# Patient Record
Sex: MALE | Race: WHITE | NOT HISPANIC OR LATINO | Employment: FULL TIME | URBAN - METROPOLITAN AREA
[De-identification: names, ages, dates, MRNs, and addresses within clinical notes are randomized per-mention and may not be internally consistent; named-entity substitution may affect disease eponyms.]

---

## 2019-12-12 ENCOUNTER — OFFICE VISIT - HEALTHEAST (OUTPATIENT)
Dept: FAMILY MEDICINE | Facility: CLINIC | Age: 34
End: 2019-12-12

## 2019-12-12 DIAGNOSIS — L03.116 CELLULITIS OF LEFT FOOT: ICD-10-CM

## 2019-12-12 DIAGNOSIS — L84 CALLUS OF FOOT: ICD-10-CM

## 2019-12-19 ENCOUNTER — SURGERY - HEALTHEAST (OUTPATIENT)
Dept: PODIATRY | Facility: CLINIC | Age: 34
End: 2019-12-19

## 2019-12-19 ENCOUNTER — OFFICE VISIT - HEALTHEAST (OUTPATIENT)
Dept: PODIATRY | Facility: CLINIC | Age: 34
End: 2019-12-19

## 2019-12-19 ENCOUNTER — AMBULATORY - HEALTHEAST (OUTPATIENT)
Dept: VASCULAR SURGERY | Facility: CLINIC | Age: 34
End: 2019-12-19

## 2019-12-19 ENCOUNTER — COMMUNICATION - HEALTHEAST (OUTPATIENT)
Dept: TELEHEALTH | Facility: CLINIC | Age: 34
End: 2019-12-19

## 2019-12-19 ENCOUNTER — HOSPITAL ENCOUNTER (OUTPATIENT)
Dept: RADIOLOGY | Facility: HOSPITAL | Age: 34
Discharge: HOME | End: 2019-12-19
Attending: PODIATRIST

## 2019-12-19 ENCOUNTER — HOSPITAL ENCOUNTER (OUTPATIENT)
Dept: MRI IMAGING | Facility: HOSPITAL | Age: 34
Discharge: HOME | End: 2019-12-19
Attending: PODIATRIST

## 2019-12-19 ENCOUNTER — OFFICE VISIT - HEALTHEAST (OUTPATIENT)
Dept: INTERNAL MEDICINE | Facility: CLINIC | Age: 34
End: 2019-12-19

## 2019-12-19 DIAGNOSIS — L03.116 CELLULITIS OF LEFT FOOT: ICD-10-CM

## 2019-12-19 DIAGNOSIS — L97.509 FOOT ULCER (H): ICD-10-CM

## 2019-12-19 DIAGNOSIS — L97.529 ULCER OF LEFT FOOT, UNSPECIFIED ULCER STAGE (H): ICD-10-CM

## 2019-12-19 DIAGNOSIS — Z01.818 PREOPERATIVE EXAMINATION: ICD-10-CM

## 2019-12-19 DIAGNOSIS — L97.523 SKIN ULCER OF LEFT FOOT WITH NECROSIS OF MUSCLE (H): ICD-10-CM

## 2019-12-19 DIAGNOSIS — L02.619 CELLULITIS AND ABSCESS OF FOOT EXCLUDING TOE: ICD-10-CM

## 2019-12-19 DIAGNOSIS — L03.119 CELLULITIS AND ABSCESS OF FOOT EXCLUDING TOE: ICD-10-CM

## 2019-12-19 ASSESSMENT — MIFFLIN-ST. JEOR: SCORE: 1654.74

## 2019-12-20 ENCOUNTER — SURGERY - HEALTHEAST (OUTPATIENT)
Dept: SURGERY | Facility: HOSPITAL | Age: 34
End: 2019-12-20

## 2019-12-20 ENCOUNTER — ANESTHESIA - HEALTHEAST (OUTPATIENT)
Dept: SURGERY | Facility: HOSPITAL | Age: 34
End: 2019-12-20

## 2019-12-20 ASSESSMENT — MIFFLIN-ST. JEOR: SCORE: 1654.74

## 2019-12-22 LAB
BACTERIA SPEC CULT: ABNORMAL
BACTERIA SPEC CULT: ABNORMAL
GRAM STAIN RESULT: ABNORMAL

## 2019-12-23 ENCOUNTER — COMMUNICATION - HEALTHEAST (OUTPATIENT)
Dept: VASCULAR SURGERY | Facility: CLINIC | Age: 34
End: 2019-12-23

## 2019-12-23 ENCOUNTER — AMBULATORY - HEALTHEAST (OUTPATIENT)
Dept: VASCULAR SURGERY | Facility: CLINIC | Age: 34
End: 2019-12-23

## 2019-12-23 DIAGNOSIS — L97.524 SKIN ULCER OF LEFT FOOT WITH NECROSIS OF BONE (H): ICD-10-CM

## 2019-12-26 ENCOUNTER — AMBULATORY - HEALTHEAST (OUTPATIENT)
Dept: VASCULAR SURGERY | Facility: CLINIC | Age: 34
End: 2019-12-26

## 2019-12-26 ENCOUNTER — COMMUNICATION - HEALTHEAST (OUTPATIENT)
Dept: VASCULAR SURGERY | Facility: CLINIC | Age: 34
End: 2019-12-26

## 2019-12-26 ENCOUNTER — AMBULATORY - HEALTHEAST (OUTPATIENT)
Dept: PODIATRY | Facility: CLINIC | Age: 34
End: 2019-12-26

## 2019-12-26 DIAGNOSIS — G89.18 POSTOPERATIVE PAIN: ICD-10-CM

## 2019-12-26 DIAGNOSIS — L97.524 SKIN ULCER OF LEFT FOOT WITH NECROSIS OF BONE (H): ICD-10-CM

## 2019-12-26 ASSESSMENT — MIFFLIN-ST. JEOR: SCORE: 1654.74

## 2019-12-30 ENCOUNTER — OFFICE VISIT - HEALTHEAST (OUTPATIENT)
Dept: PODIATRY | Facility: CLINIC | Age: 34
End: 2019-12-30

## 2019-12-30 ENCOUNTER — AMBULATORY - HEALTHEAST (OUTPATIENT)
Dept: VASCULAR SURGERY | Facility: CLINIC | Age: 34
End: 2019-12-30

## 2019-12-30 DIAGNOSIS — L97.524 SKIN ULCER OF LEFT FOOT WITH NECROSIS OF BONE (H): ICD-10-CM

## 2019-12-30 ASSESSMENT — MIFFLIN-ST. JEOR: SCORE: 1654.74

## 2020-01-03 ENCOUNTER — AMBULATORY - HEALTHEAST (OUTPATIENT)
Dept: VASCULAR SURGERY | Facility: CLINIC | Age: 35
End: 2020-01-03

## 2020-01-06 ENCOUNTER — AMBULATORY - HEALTHEAST (OUTPATIENT)
Dept: VASCULAR SURGERY | Facility: CLINIC | Age: 35
End: 2020-01-06

## 2020-01-06 DIAGNOSIS — L97.524 SKIN ULCER OF LEFT FOOT WITH NECROSIS OF BONE (H): ICD-10-CM

## 2020-01-08 ENCOUNTER — OFFICE VISIT - HEALTHEAST (OUTPATIENT)
Dept: VASCULAR SURGERY | Facility: CLINIC | Age: 35
End: 2020-01-08

## 2020-01-08 DIAGNOSIS — L97.524 SKIN ULCER OF LEFT FOOT WITH NECROSIS OF BONE (H): ICD-10-CM

## 2020-01-08 ASSESSMENT — MIFFLIN-ST. JEOR: SCORE: 1654.74

## 2020-01-10 ENCOUNTER — AMBULATORY - HEALTHEAST (OUTPATIENT)
Dept: VASCULAR SURGERY | Facility: CLINIC | Age: 35
End: 2020-01-10

## 2020-01-10 ENCOUNTER — RECORDS - HEALTHEAST (OUTPATIENT)
Dept: ADMINISTRATIVE | Facility: OTHER | Age: 35
End: 2020-01-10

## 2020-01-13 ENCOUNTER — AMBULATORY - HEALTHEAST (OUTPATIENT)
Dept: VASCULAR SURGERY | Facility: CLINIC | Age: 35
End: 2020-01-13

## 2020-01-13 DIAGNOSIS — L97.524 SKIN ULCER OF LEFT FOOT WITH NECROSIS OF BONE (H): ICD-10-CM

## 2020-01-15 ENCOUNTER — AMBULATORY - HEALTHEAST (OUTPATIENT)
Dept: VASCULAR SURGERY | Facility: CLINIC | Age: 35
End: 2020-01-15

## 2020-01-15 DIAGNOSIS — L97.524 SKIN ULCER OF LEFT FOOT WITH NECROSIS OF BONE (H): ICD-10-CM

## 2020-01-16 ENCOUNTER — OFFICE VISIT - HEALTHEAST (OUTPATIENT)
Dept: INFECTIOUS DISEASES | Facility: CLINIC | Age: 35
End: 2020-01-16

## 2020-01-16 DIAGNOSIS — T14.8XXA OPEN WOUND: ICD-10-CM

## 2020-01-17 ENCOUNTER — AMBULATORY - HEALTHEAST (OUTPATIENT)
Dept: VASCULAR SURGERY | Facility: CLINIC | Age: 35
End: 2020-01-17

## 2020-01-20 ENCOUNTER — AMBULATORY - HEALTHEAST (OUTPATIENT)
Dept: VASCULAR SURGERY | Facility: CLINIC | Age: 35
End: 2020-01-20

## 2020-01-20 DIAGNOSIS — L97.524 SKIN ULCER OF LEFT FOOT WITH NECROSIS OF BONE (H): ICD-10-CM

## 2020-01-20 ASSESSMENT — MIFFLIN-ST. JEOR: SCORE: 1709.17

## 2020-01-22 ENCOUNTER — OFFICE VISIT - HEALTHEAST (OUTPATIENT)
Dept: VASCULAR SURGERY | Facility: CLINIC | Age: 35
End: 2020-01-22

## 2020-01-22 DIAGNOSIS — L97.524 SKIN ULCER OF LEFT FOOT WITH NECROSIS OF BONE (H): ICD-10-CM

## 2020-02-05 ENCOUNTER — OFFICE VISIT - HEALTHEAST (OUTPATIENT)
Dept: VASCULAR SURGERY | Facility: CLINIC | Age: 35
End: 2020-02-05

## 2020-02-05 DIAGNOSIS — L97.524 SKIN ULCER OF LEFT FOOT WITH NECROSIS OF BONE (H): ICD-10-CM

## 2020-02-26 ENCOUNTER — OFFICE VISIT - HEALTHEAST (OUTPATIENT)
Dept: VASCULAR SURGERY | Facility: CLINIC | Age: 35
End: 2020-02-26

## 2020-02-26 DIAGNOSIS — L97.524 SKIN ULCER OF LEFT FOOT WITH NECROSIS OF BONE (H): ICD-10-CM

## 2020-02-26 ASSESSMENT — MIFFLIN-ST. JEOR: SCORE: 1709.17

## 2020-03-03 ENCOUNTER — COMMUNICATION - HEALTHEAST (OUTPATIENT)
Dept: OTHER | Facility: CLINIC | Age: 35
End: 2020-03-03

## 2020-03-10 ENCOUNTER — AMBULATORY - HEALTHEAST (OUTPATIENT)
Dept: OTHER | Facility: CLINIC | Age: 35
End: 2020-03-10

## 2020-03-20 ENCOUNTER — COMMUNICATION - HEALTHEAST (OUTPATIENT)
Dept: VASCULAR SURGERY | Facility: CLINIC | Age: 35
End: 2020-03-20

## 2020-03-25 ENCOUNTER — COMMUNICATION - HEALTHEAST (OUTPATIENT)
Dept: VASCULAR SURGERY | Facility: CLINIC | Age: 35
End: 2020-03-25

## 2020-03-26 ENCOUNTER — OFFICE VISIT - HEALTHEAST (OUTPATIENT)
Dept: VASCULAR SURGERY | Facility: CLINIC | Age: 35
End: 2020-03-26

## 2020-04-22 ENCOUNTER — COMMUNICATION - HEALTHEAST (OUTPATIENT)
Dept: VASCULAR SURGERY | Facility: CLINIC | Age: 35
End: 2020-04-22

## 2020-04-23 ENCOUNTER — AMBULATORY - HEALTHEAST (OUTPATIENT)
Dept: OTHER | Facility: CLINIC | Age: 35
End: 2020-04-23

## 2020-07-29 ENCOUNTER — OFFICE VISIT - HEALTHEAST (OUTPATIENT)
Dept: VASCULAR SURGERY | Facility: CLINIC | Age: 35
End: 2020-07-29

## 2020-07-29 DIAGNOSIS — L57.0 KERATOMA: ICD-10-CM

## 2020-07-29 DIAGNOSIS — M21.172 VARUS FOOT DEFORMITY, ACQUIRED, LEFT: ICD-10-CM

## 2021-05-26 VITALS
TEMPERATURE: 98 F | DIASTOLIC BLOOD PRESSURE: 78 MMHG | HEART RATE: 82 BPM | SYSTOLIC BLOOD PRESSURE: 120 MMHG | RESPIRATION RATE: 18 BRPM

## 2021-05-26 VITALS
HEART RATE: 80 BPM | DIASTOLIC BLOOD PRESSURE: 80 MMHG | TEMPERATURE: 97.7 F | SYSTOLIC BLOOD PRESSURE: 138 MMHG | RESPIRATION RATE: 18 BRPM

## 2021-05-26 VITALS — HEART RATE: 88 BPM | DIASTOLIC BLOOD PRESSURE: 68 MMHG | SYSTOLIC BLOOD PRESSURE: 118 MMHG | TEMPERATURE: 98.1 F

## 2021-05-27 ENCOUNTER — RECORDS - HEALTHEAST (OUTPATIENT)
Dept: ADMINISTRATIVE | Facility: CLINIC | Age: 36
End: 2021-05-27

## 2021-05-27 VITALS
TEMPERATURE: 97.8 F | SYSTOLIC BLOOD PRESSURE: 128 MMHG | DIASTOLIC BLOOD PRESSURE: 70 MMHG | RESPIRATION RATE: 20 BRPM | HEART RATE: 84 BPM

## 2021-05-27 VITALS
TEMPERATURE: 97.6 F | SYSTOLIC BLOOD PRESSURE: 118 MMHG | RESPIRATION RATE: 18 BRPM | HEART RATE: 84 BPM | DIASTOLIC BLOOD PRESSURE: 76 MMHG

## 2021-05-27 VITALS — HEART RATE: 88 BPM | SYSTOLIC BLOOD PRESSURE: 146 MMHG | DIASTOLIC BLOOD PRESSURE: 80 MMHG | TEMPERATURE: 98 F

## 2021-05-27 VITALS
TEMPERATURE: 98.4 F | DIASTOLIC BLOOD PRESSURE: 76 MMHG | RESPIRATION RATE: 16 BRPM | HEART RATE: 100 BPM | SYSTOLIC BLOOD PRESSURE: 116 MMHG

## 2021-05-27 VITALS
HEART RATE: 84 BPM | TEMPERATURE: 97.5 F | SYSTOLIC BLOOD PRESSURE: 120 MMHG | RESPIRATION RATE: 16 BRPM | DIASTOLIC BLOOD PRESSURE: 76 MMHG

## 2021-05-30 ENCOUNTER — RECORDS - HEALTHEAST (OUTPATIENT)
Dept: ADMINISTRATIVE | Facility: CLINIC | Age: 36
End: 2021-05-30

## 2021-06-04 VITALS
SYSTOLIC BLOOD PRESSURE: 118 MMHG | WEIGHT: 163 LBS | BODY MASS INDEX: 24.14 KG/M2 | DIASTOLIC BLOOD PRESSURE: 68 MMHG | HEIGHT: 69 IN | HEART RATE: 88 BPM | TEMPERATURE: 98.1 F

## 2021-06-04 VITALS
HEIGHT: 69 IN | HEART RATE: 92 BPM | SYSTOLIC BLOOD PRESSURE: 118 MMHG | TEMPERATURE: 98 F | RESPIRATION RATE: 20 BRPM | BODY MASS INDEX: 25.84 KG/M2 | DIASTOLIC BLOOD PRESSURE: 80 MMHG

## 2021-06-04 VITALS
SYSTOLIC BLOOD PRESSURE: 116 MMHG | DIASTOLIC BLOOD PRESSURE: 78 MMHG | TEMPERATURE: 98.2 F | WEIGHT: 175 LBS | BODY MASS INDEX: 25.84 KG/M2 | HEART RATE: 100 BPM

## 2021-06-04 VITALS
HEIGHT: 69 IN | DIASTOLIC BLOOD PRESSURE: 76 MMHG | BODY MASS INDEX: 24.14 KG/M2 | HEART RATE: 80 BPM | WEIGHT: 163 LBS | SYSTOLIC BLOOD PRESSURE: 136 MMHG

## 2021-06-04 VITALS
BODY MASS INDEX: 25.92 KG/M2 | RESPIRATION RATE: 16 BRPM | HEIGHT: 69 IN | HEART RATE: 84 BPM | WEIGHT: 175 LBS | SYSTOLIC BLOOD PRESSURE: 118 MMHG | DIASTOLIC BLOOD PRESSURE: 70 MMHG | TEMPERATURE: 98 F

## 2021-06-04 VITALS
WEIGHT: 175 LBS | RESPIRATION RATE: 16 BRPM | DIASTOLIC BLOOD PRESSURE: 70 MMHG | TEMPERATURE: 98.6 F | BODY MASS INDEX: 25.92 KG/M2 | HEART RATE: 100 BPM | SYSTOLIC BLOOD PRESSURE: 120 MMHG | HEIGHT: 69 IN

## 2021-06-04 VITALS
DIASTOLIC BLOOD PRESSURE: 89 MMHG | RESPIRATION RATE: 12 BRPM | HEART RATE: 94 BPM | SYSTOLIC BLOOD PRESSURE: 138 MMHG | WEIGHT: 166.9 LBS | TEMPERATURE: 97.8 F | OXYGEN SATURATION: 96 %

## 2021-06-04 VITALS
RESPIRATION RATE: 16 BRPM | DIASTOLIC BLOOD PRESSURE: 86 MMHG | BODY MASS INDEX: 24.14 KG/M2 | WEIGHT: 163 LBS | HEART RATE: 86 BPM | SYSTOLIC BLOOD PRESSURE: 122 MMHG | TEMPERATURE: 97.7 F | HEIGHT: 69 IN

## 2021-06-04 VITALS
HEIGHT: 69 IN | HEART RATE: 104 BPM | BODY MASS INDEX: 24.14 KG/M2 | RESPIRATION RATE: 16 BRPM | DIASTOLIC BLOOD PRESSURE: 78 MMHG | TEMPERATURE: 98 F | SYSTOLIC BLOOD PRESSURE: 150 MMHG | WEIGHT: 163 LBS

## 2021-06-04 VITALS — BODY MASS INDEX: 24.14 KG/M2 | HEIGHT: 69 IN | WEIGHT: 163 LBS

## 2021-06-04 VITALS
HEART RATE: 72 BPM | BODY MASS INDEX: 24.07 KG/M2 | TEMPERATURE: 97.9 F | RESPIRATION RATE: 17 BRPM | HEIGHT: 69 IN | SYSTOLIC BLOOD PRESSURE: 110 MMHG | DIASTOLIC BLOOD PRESSURE: 70 MMHG

## 2021-06-04 VITALS — TEMPERATURE: 98 F | SYSTOLIC BLOOD PRESSURE: 132 MMHG | DIASTOLIC BLOOD PRESSURE: 76 MMHG | WEIGHT: 166 LBS

## 2021-06-04 NOTE — ANESTHESIA PROCEDURE NOTES
Peripheral Block    Patient location during procedure: pre-op  Start time: 12/20/2019 3:50 PM  End time: 12/20/2019 3:55 PM  post-op analgesia per surgeon order as noted in medical record  Staffing:  Performing  Anesthesiologist: Miller Medina MD  Preanesthetic Checklist  Completed: patient identified, site marked, risks, benefits, and alternatives discussed, timeout performed, consent obtained, airway assessed, oxygen available, suction available, emergency drugs available and hand hygiene performed  Peripheral Block  Block type: sciatic, popliteal  Prep: ChloraPrep  Patient position: sitting  Patient monitoring: cardiac monitor, blood pressure, heart rate and continuous pulse oximetry  Laterality: left  Injection technique: ultrasound guided    Ultrasound used to visualize needle placement in proximity to nerve being blocked: yes   US used to visualize anesthetic spread  Visualized anatomic structures normal  No Pathological Findings  Permanent ultrasound image captured for medical record    Needle  Needle type: Stimuplex   Needle gauge: 20G  Needle length: 4 in  no peripheral nerve catheter placed  Assessment  Injection assessment: no difficulty with injection, negative aspiration for heme, no paresthesia on injection and incremental injection

## 2021-06-04 NOTE — PATIENT INSTRUCTIONS - HE
"    Important Instructions                     How to care for your wound    Cleanse wound with saline sent to you with your supplies or a wound wash found at the pharmacy.      Pat dry the wound with 4\"x4\" gauze      1. 3x weekly and as needed cleanse the wound NS    2. Pat dry    3. Apply Cavilon no sting barrier wipe to the skin surrounding the wound to protect from drainage/maceration    4. Cut strip of drape and apply to skin wear the bridging will occur if needed; plan this area in advance; should not be over bony prominence     5. Cut the foam to fit the size of the wound    6. Apply to wound    7. Cut narrow strip of foam for bridging if needed    8. Cover with drape to obtain air tight seal    9. Cut opening the size of a quarter for where the suction pad will be applied    10. Apply Suction pad    11. 125mmHG suction continuous       Do not get your ulcer wet when you shower.  You can cover it with a cast protector. Cast protectors are available for purchase at [facility]. You may also purchase a cast protector at your local pharmacy.      Good nutrition is important for wound healing.  I recommend increasing your protein.  You can do this through your diet, nutritional supplements, and/or protein powder.    It is ok to continue current wound care treatment/products for the next 2-3 days until new wound care supplies are ordered and arrive. If longer than this please contact our office.    Please call the [facility] before substituting any product    Call our clinic nurse at [phone] if there is an increase in drainage, pain, redness, or the wound size increases.  This maybe a sign of infection and require attention prior to the next appointment        "

## 2021-06-04 NOTE — PROGRESS NOTES
Surgery/Procedure: incision and drainage left foot abscess    Special Equipment: TBD    Location: [facility]    Date: 12/20/2019    Time: 5:15pm     Surgeon: Dr. Crandall    Assist: no    Length of Surgery: 60 mi n     OR Confirmed/ :  Yes with Esperanza on 12/19/2019    Orders In:  Yes    Provider Team Notified:  Yes    Entered on [place] [facility] [place] Calendar:  Yes    Post Op: 13/30/2019 @  DR crandall

## 2021-06-04 NOTE — ANESTHESIA CARE TRANSFER NOTE
Last vitals:   Vitals:    12/20/19 1635   BP: 120/63   Pulse: 79   Resp: 18   Temp: 36.7  C (98.1  F)   SpO2: 97%     Patient's level of consciousness is drowsy  Spontaneous respirations: yes  Maintains airway independently: yes  Dentition unchanged: yes  Oropharynx: oropharynx clear of all foreign objects    QCDR Measures:  ASA# 20 - Surgical Safety Checklist: WHO surgical safety checklist completed prior to induction    PQRS# 430 - Adult PONV Prevention: 4558F - Pt received => 2 anti-emetic agents (different classes) preop & intraop  ASA# 8 - Peds PONV Prevention: NA - Not pediatric patient, not GA or 2 or more risk factors NOT present  PQRS# 424 - Wandy-op Temp Management: 4559F - At least one body temp DOCUMENTED => 35.5C or 95.9F within required timeframe  PQRS# 426 - PACU Transfer Protocol: - Transfer of care checklist used  ASA# 14 - Acute Post-op Pain: ASA14B - Patient did NOT experience pain >= 7 out of 10

## 2021-06-04 NOTE — ANESTHESIA POSTPROCEDURE EVALUATION
Patient: Levi Soliz  INCISION AND DRAINAGE, Left foot  Anesthesia type: general    Patient location: PACU  Last vitals:   Vitals Value Taken Time   /88 12/20/2019  4:50 PM   Temp 36.7  C (98.1  F) 12/20/2019  4:35 PM   Pulse 76 12/20/2019  5:00 PM   Resp 18 12/20/2019  4:35 PM   SpO2 97 % 12/20/2019  5:00 PM   Vitals shown include unvalidated device data.  Post vital signs: stable  Level of consciousness: awake and responds to simple questions  Post-anesthesia pain: pain controlled  Post-anesthesia nausea and vomiting: no  Pulmonary: unassisted, return to baseline  Cardiovascular: stable and blood pressure at baseline  Hydration: adequate  Anesthetic events: no    QCDR Measures:  ASA# 11 - Wandy-op Cardiac Arrest: ASA11B - Patient did NOT experience unanticipated cardiac arrest  ASA# 12 - Wandy-op Mortality Rate: ASA12B - Patient did NOT die  ASA# 13 - PACU Re-Intubation Rate: NA - No ETT / LMA used for case  ASA# 10 - Composite Anes Safety: ASA10A - No serious adverse event    Additional Notes:

## 2021-06-04 NOTE — ANESTHESIA PREPROCEDURE EVALUATION
Anesthesia Evaluation      Patient summary reviewed   No history of anesthetic complications     Airway   Mallampati: I  Neck ROM: full   Pulmonary - negative ROS and normal exam   (+) a smoker                         Cardiovascular - negative ROS and normal exam  Exercise tolerance: > or = 4 METS  (-) murmur  Rhythm: regular  Rate: normal,    no murmur      Neuro/Psych - negative ROS     Endo/Other - negative ROS      GI/Hepatic/Renal - negative ROS   (+) GERD,        Other findings: Spina bifida      Dental - normal exam                        Anesthesia Plan  Planned anesthetic: general mask and peripheral nerve block    ASA 2   Induction: intravenous   Anesthetic plan and risks discussed with: patient and parent/guardian  Anesthesia plan special considerations: antiemetics,   Post-op plan: routine recovery

## 2021-06-04 NOTE — PATIENT INSTRUCTIONS - HE
"      Important Instructions     * make an appointment for your MRI     * make an appointment for a pre-op physical                    How to care for your wound    Cleanse wound with saline sent to you with your supplies or a wound wash found at the pharmacy.      Pat dry the wound with 4\"x4\" gauze      Cover the wound with [id number] dry gauze.      Wrap the affected area with 4 inch roll gauze.      Secure dressings in place with paper tape.      Change dressing: every day      Do not get your ulcer wet when you shower.  You can cover it with a cast protector. Cast protectors are available for purchase at [facility]. You may also purchase a cast protector at your local pharmacy.      Good nutrition is important for wound healing.  I recommend increasing your protein.  You can do this through your diet, nutritional supplements, and/or protein powder.    It is ok to continue current wound care treatment/products for the next 2-3 days until new wound care supplies are ordered and arrive. If longer than this please contact our office.    Please call the [facility] before substituting any product    Call our clinic nurse at [phone] if there is an increase in drainage, pain, redness, or the wound size increases.  This maybe a sign of infection and require attention prior to the next appointment    Surgery Information    Surgery Date 12/20/19    Surgery Time TBD  ( Arrive at the hospital two hours prior to your surgery and 1 hour prior at the surgery center. Check in at the . The only exception is if you are scheduled for surgery at 7am, you should arrive at 5:30am)    IF YOU NEED TO RESCHEDULE OR CANCEL YOUR SURGERY FOR ANY REASON PLEASE CALL THE CLINIC AS SOON AS POSSIBLE [phone].    Admission Type: Outpatient    Surgeon: Dr. Crandall    Surgery Procedure: Incision & drainage left foot    Surgery Location: [facility],[address] [place] [address] " Admitting      Additional Information: If you use a CPAP machine for sleep apnea please bring it with you for surgery. We will want to monitor your breathing using your normal equipment.     HOSPITAL AND SURGERY CENTER INFORMATION    We need to know a lot of information about you before surgery.     1-5 Days Before:     A nurse will call you for a pre-screening interview. The phone call with the nurse will be much faster and easier if you  Have organized your information prior to the call.     Please have the following information available:    Preoperative Exam completed and faxed to our office    Primary care provider's and any specialty providers' contact information available. .    If requested by your primary care provider, have any heart and lung exams at least 3 days before surgery.    Have a complete and accurate list of medications available.     Have a list of your allergies/sensitivities and reactions    Know your health history, surgical history, and medical problems    Know any anesthesia issues with you or within your family.     BE SURE TO NOTIFY US IF YOU ARE TAKING ANY BLOOD THINNING AGENTS: Coumadin, Plavix, Aspirin, Xarelto, Eliquis    Someone planned to bring you and stay with you after the surgery    Day Before Surgery    1. STOP Smoking and Drinking: It is important to stop smoking and drinking at least 24 hours before surgery. Smoking and drinking may cause complications in your recovery from anesthesia and may lengthen the healing process.    2. Pack for your hospital stay: If it will be required for you to stay at the hospital after surgery, bring personal items such as a robe, slippers, pajamas, additional clothing and toiletries. Don't forget:    List of medication    Eyeglass case or contact case with solution. You cannot wear contacts during surgery    Copies of your physical exam , lab results and EKG    Copy of Health Care Directives, Living Will or Power of     Insurance  Cards    Photo ID    CPAP machine    3. NOTHING BY MOUTH 8 HOURS BEFORE. This includes gum, hard candy, water and mints. The only exception is if you have been instructed by your doctor to take your medications with sips of water. You may rinse your mouth or brush your teeth, but do not swallow water.     4. Remove Nail Polish  Day of Surgery    1. Medications- Take as indicated with sips of water     2. Wear comfortable loose fitting clothes. Wear your glasses-Not contacts. Do not wear jewelry and remove body piercing's. Surgery may be cancelled if they are not removed.     3. If same day surgery-Have a someone come with you to surgery that can help you understand the surgeon's instructions, drive you home and stay with you overnight the first night.     4. A nurse will call you at home within 72 hours following surgery to see how you are doing. Our nurses and doctors will discuss recovery with you.

## 2021-06-04 NOTE — PROGRESS NOTES
FOOT AND ANKLE SURGERY/PODIATRY Progress Note      ASSESSMENT:   Ulceration left foot      TREATMENT:  -The left foot ulceration is progressing well. No signs of infection. I recommend he continue to remain non-weight bearing with the knee walker, continue with the wound vac. He will finish course of Bactrim. Reviewed C&S.     -After discussion of risk factors and consent obtained 2% Lidocaine HCL jelly was applied, under clean conditions, the left and foot ulceration(s) were debrided using currette.  Devitalized and nonviable tissue, along with any fibrin and slough, was removed to improve granulation tissue formation, stimulate wound healing, decrease overall bacteria load, disrupt biofilm formation and decrease edge senescence.  Total excisional debridement was 3.4 sq cm into the muscle/fascia with a depth of 0.8 cm.   Ulcers were improved afterwards and .  Measures were as noted on the flow sheet. Patient tolerated this well. Wound vac applied today.    -He will follow-up with me in one week.       Jai Crandall DPM  [facility]      HPI: Levi Soliz was seen again today for a left foot ulceration s/p I&D. Doing well. He has used the wound vac x4 days. Remained non-weight bearing on the knee walker.       Past Medical History:   Diagnosis Date     GERD (gastroesophageal reflux disease)      Spina bifida (H)        Past Surgical History:   Procedure Laterality Date     INCISION AND DRAINAGE OF WOUND Left 12/20/2019    Procedure: INCISION AND DRAINAGE, Left foot;  Surgeon: Jai Crandall DPM;  [place] [facility] [place]  Service: Podiatry       No Known Allergies      Current Outpatient Medications:      HYDROcodone-acetaminophen 5-325 mg per tablet, Take 1 tablet by mouth every 4 (four) hours as needed., Disp: 30 tablet, Rfl: 0     HYDROcodone-acetaminophen 5-325 mg per tablet, Take 1 tablet by mouth every 4 (four) hours as needed for pain., Disp: 18 tablet, Rfl: 0      ranitidine (ZANTAC) 150 MG capsule, Take 150 mg by mouth every evening., Disp: , Rfl:     Review of Systems - 10 point Review of Systems is negative except for left foot which is noted in HPI.      OBJECTIVE:  Vitals:    12/30/19 0909   BP: 118/68   Pulse: 88   Temp: 98.1  F (36.7  C)     General appearance: Patient is alert and fully cooperative with history & exam.  No sign of distress is noted during the visit.   Vascular: Dorsalis pedis palpableLeft.  Dermatologic:    VASC Wound 12/19/19 Left plantar 5th MPJ (Active)   Pre Size Length 1.7 12/30/2019  9:05 AM   Pre Size Width 2 12/30/2019  9:05 AM   Pre Size Depth 1.5 12/30/2019  9:05 AM   Pre Total Sq cm 3.4 12/30/2019  9:05 AM   Undermined no  12/30/2019 10:00 AM       VASC Wound 12/26/19 left foot (Active)       Incision 12/20/19 Surgical Foot Left (Active)   Granular tissue, exposed 5th MPJ capsule along the plantar joint. No erythema.   Neurologic: Diminished to light touch Left.  Musculoskeletal: Contracted digits noted Left.    Imaging:     Xr Orbit Foreign Body Pre Mr    Result Date: 12/20/2019  EXAM: XR ORBIT FOREIGN BODY PRE MR LOCATION: [facility] DATE/TIME: 12/19/2019 3:11 PM INDICATION: pre mri  COMPARISON: None.     Negative orbits. Both eyes are negative for metallic foreign bodies. Metallic density permanent retainer is superimposed over the anterior mandibular teeth.    Xr Foot Left 3 Or More Vws Standing    Result Date: 12/20/2019  Negative for bony destruction or cortical reaction.     Mr Forefoot With Without Contrast Left    Result Date: 12/19/2019  EXAM: MR FOREFOOT W WO CONTRAST LEFT LOCATION: [facility] DATE/TIME: 12/19/2019 4:20 PM INDICATION: Osteomyelitis fifth metatarsal left. COMPARISON: None. TECHNIQUE: Routine. Additional postgadolinium T1 sequences were obtained. IV CONTRAST: Gadavist 8ml FINDINGS:  JOINTS AND BONES: There is evidence of old healed fracture of the fifth metatarsal. This fracture  appears well-healed. There is T2 signal abnormality and abnormal enhancement within the fifth metatarsal head but no loss of T1 marrow signal. However, there is slight cortical irregularity along the undersurface margin and findings are worrisome for early developing osteomyelitis but there is no bone destruction present. There is no evidence for abscess. There is a tiny amount of fluid within the fifth  MTP joint and septic arthropathy could have this appearance. The proximal phalanx of the fifth toe appears normal, however. Hammertoe deformities. No evidence for acute fracture. Mild bunion deformity. TENDONS: The Achilles tendon is not included on the field-of-view and cannot be evaluated. Within the foot, the flexor tendons are negative for tendinopathy, tenosynovitis, or tearing. The extensor tendons are negative for tendinopathy, tenosynovitis, or  tearing. The hallucis tendons are negative. LIGAMENTS: The ligament of Lisfranc is intact. The collateral ligament at the MTP joints are all intact. MUSCLES AND SOFT TISSUES: There is significant muscular atrophy of all of the interosseous muscles. The previously described focus of edema/inflammation along the inferolateral margin of the forefoot adjacent to the fifth MTP joint. Additional edema or cellulitis along the dorsal aspect of the foot. No evidence for organized fluid collection or abscess.     1.  There is evidence of an old healed fracture of the fifth metatarsal. 2.  There is T2 signal abnormality within the fifth metatarsal head as well as abnormal enhancement. While there is no confluent loss of T1 marrow signal, there is slight cortical irregularity along the undersurface and findings are worrisome for early developing changes of osteomyelitis. 3. There is a tiny amount of fluid within the fifth MTP joint. A septic arthropathy could have this appearance. There is no       evidence for marrow signal abnormality within the base of the proximal phalanx of  "the fifth toe. 4. Surrounding edema or cellulitis but no evidence for abscess. 5. Additional edema or cellulitis along the dorsal aspect of the foot. 6. No evidence for tendinous or ligamentous tearing. 7. Fatty infiltration and atrophy of all of the interosseous muscles. 8. Exam otherwise negative.     Poc Us Guidance Needle Placement    Result Date: 12/20/2019  Ultrasound was performed as guidance to an anesthesia procedure.  Click \"PACS images\" hyperlink below to view any stored images.  For specific procedure details, view procedure note authored by anesthesia.         Picture: None    "

## 2021-06-04 NOTE — TELEPHONE ENCOUNTER
Patient is calling and would like another refill of the Hydrocodone-acetaminophen, to help with the discomfort of the wound vac

## 2021-06-04 NOTE — PROGRESS NOTES
FOOT AND ANKLE SURGERY/PODIATRY CONSULT NOTE         ASSESSMENT:   Cellulitis left foot  Ulceration left foot       TREATMENT:  -Large ulceration along the plantar 5th MPJ which probes to deep tissues, possible bone, non-viable tissue with localized erythema. Wound culture obtained today. Because the wound probes to deep tissues, I would like to evaluate for osteomyelitis and have referred him for an MRI. I reviewed today's x-rays which are negative for bony destruction or cortical reaction.     -Sharp, excisional debridement with a #15 blade into level of muscle debriding 5 sq cm. Patient tolerated the procedure well. Gauze dressing applied which he will change daily.     -I recommend at minimum surgical debridement to remove non-viable tissue. If MRI report indicates osteomyelitis, we discussed amputation of the 5th ray but will likely avoid amputation as x-rays are negative for bony destruction. Bone biopsy would be necessary.     -All questions invited and answered. I have extended Bactrim for an additional 10 days, d/c keflex. STAT MRI for today. I have asked my office to coordinate surgery at [facility] for tomorrow afternoon. Pre-op physical with [facility] family practice today.     Jai Crandall, WOO  [facility]/Foot & Ankle Surgery      HPI: I was asked to see Levi Soliz today by Kari Dorman for a left foot infection. The patient reports noticing redness along the 5th MPJ 2-3 weeks ago. He was seen in walk-in care and placed on bactrim. PMH is significant for spinda bifida and has tried foot inserts in the past to offload the plantar joint. He also underwent an elevating osteotomy of the 5th metatarsal in the past. Patient denies N/V/F/C.     No past medical history on file.    No past surgical history on file.    No Known Allergies      Current Outpatient Medications:      cephalexin (KEFLEX) 500 MG capsule, TK 1 C PO TID TAT, Disp: , Rfl:      sulfamethoxazole-trimethoprim  (BACTRIM DS) 800-160 mg per tablet, Take 1 tablet by mouth 2 (two) times a day for 10 days., Disp: 20 tablet, Rfl: 0    No family history on file.    Social History     Socioeconomic History     Marital status: Single     Spouse name: Not on file     Number of children: Not on file     Years of education: Not on file     Highest education level: Not on file   Occupational History     Not on file   Social Needs     Financial resource strain: Not on file     Food insecurity:     Worry: Not on file     Inability: Not on file     Transportation needs:     Medical: Not on file     Non-medical: Not on file   Tobacco Use     Smoking status: Current Every Day Smoker     Smokeless tobacco: Never Used   Substance and Sexual Activity     Alcohol use: Not on file     Drug use: Not on file     Sexual activity: Not on file   Lifestyle     Physical activity:     Days per week: Not on file     Minutes per session: Not on file     Stress: Not on file   Relationships     Social connections:     Talks on phone: Not on file     Gets together: Not on file     Attends Taoism service: Not on file     Active member of club or organization: Not on file     Attends meetings of clubs or organizations: Not on file     Relationship status: Not on file     Intimate partner violence:     Fear of current or ex partner: Not on file     Emotionally abused: Not on file     Physically abused: Not on file     Forced sexual activity: Not on file   Other Topics Concern     Not on file   Social History Narrative     Not on file       Review of Systems - 10 point Review of Systems is negative except for left foot infection which is noted in HPI.    OBJECTIVE:  Appearance: alert, well appearing, and in no distress.    Vitals:    12/19/19 1246   BP: 132/76   Temp: 98  F (36.7  C)       BMI= There is no height or weight on file to calculate BMI.    General appearance: Patient is alert and fully cooperative with history & exam.  No sign of distress is noted  during the visit.     Psychiatric: Affect is pleasant & appropriate.  Patient appears motivated to improve health.     Respiratory: Breathing is regular & unlabored while sitting.     HEENT: Hearing is intact to spoken word.  Speech is clear.  No gross evidence of visual impairment that would impact ambulation.      Vascular: Dorsalis pedis and posterior tibial pulses are palpable. There is pedal hair growth left.  CFT < 3 sec from anterior tibial surface to distal digits left. Mild edema 5th MPJ left.   Dermatologic: After sharp debridement of hyperkeratotic tissue plantar 5th MPJ, there is a full thickness ulceration measuring 2.5x2.5x1.1cm, probes to deep tissues and possible to bone with significant non-viable tissue. No fluctuance. Localized erythema to the 5th MPJ without ascending cellulitis. No active drainage or purulence noted.   Neurologic: Diminished to light touch left foot.   Musculoskeletal: Increased medial arch left with prominent metatarsal heads, contracted digits.

## 2021-06-04 NOTE — PATIENT INSTRUCTIONS - HE
If you do not have a back up plan in place: If the negative pressure wound therapy malfunctions or unable to maintain seal: dressing must be removed and reapplied within 2 hours of the incident. If unable to reapply negative pressure wound dressing, place Normal Saline moistened gauze in wound bed and cover with appropriate dressing to keep wound bed moist.  Change wet-to-dry dressing two times a day until healthcare staff can re-implement negative pressure therapy.   Change canister at least weekly.  [facility], later [facility] Contact Center can be reached at [phone], 24 hours a day 7 days a week    What is V.A.C.  Therapy?   V.A.C.  Therapy is a medical device system that   promotes wound healing by delivering negative   pressure (a vacuum) to the wound through a   patented dressing and therapy unit creating an   environment that promotes the wound healing   process. This negative pressure helps draw wound   edges together, remove wound fluids and infectious   materials and promote granulation tissue formation   (the connective tissue in healing wounds).   Unlike gauze bandages that merely cover a wound,   V.A.C.  Therapy actively works to help the wound   healing process.   The V.A.C.  Therapy System helps:     Promote wound healing     Provide a moist wound healing environment     Draw wound edges together     Remove fluid and infectious materials     Reduce wound odor     Reduce the need for daily dressing changes   When should I call my clinician when on   V.A.C.  Therapy?   Immediately report to your clinician if you have any   of these symptoms:     Fever over 102      Diarrhea     Headache     Sore throat     Confusion     Sick to your stomach or throwing up     Dizziness or feel faint when you stand up     Redness around the wound     Skin itches or rash present     Wound is sore, red or swollen     Pus or bad smell from the wound     Area in or around wound feels very warm    Vacuum-Assisted Closure of a  Wound  Vacuum-assisted closure (VAC) of a wound is a type of treatment to help wounds heal. It s also known as negative pressure wound therapy. During the treatment, a device lowers air pressure on the wound. This can help the wound heal more quickly.  Understanding the wound VAC system  A wound VAC system has several parts. A foam or gauze dressing is put directly on the wound. The dressing is changed every 24 to 72 hours. An adhesive film covers and seals the dressing and wound. A drainage tube leads from under the adhesive film and connects to a portable vacuum pump. This pump removes air pressure over the wound. It may do this constantly. Or it may do it in cycles. During the treatment, you ll need to carry the portable pump everywhere you go.  Why wound VAC is used  You might need this therapy for a recent traumatic wound. Or you may need it for a chronic wound. This is a wound that does not heal the way it should over time. This can happen with wounds in people who have diabetes. You may need a wound VAC if you ve had a recent skin graft. And you may need a wound VAC for a large wound. Large wounds can take a longer time to heal.  A wound vacuum system may help your wound heal more quickly by:    Draining extra fluid from the wound    Reducing swelling    Reducing bacteria in the wound    Keeping your wound moist and warm    Helping draw together wound edges    Increasing blood flow to your wound    Decreasing inflammation  Wound VAC offers some other advantages over other types of wound care. It may decrease your overall discomfort. The dressings usually need to be changed less often. And they may be easier to keep in position.  Risks of wound VAC  Wound VAC has some rare risks, such as:    Bleeding (which may be severe)    Wound infection    An abnormal connection between the intestinal tract and the skin (enteric fistula)  Proper training in dressing changes can help reduce the risk for these complications.  Also, your doctor will carefully evaluate you to make sure you are a good candidate for the therapy. Certain problems can increase your risk for complications. These include:    Exposed organs or blood vessels    High risk of bleeding from another medical problem    Wound infection    Nearby bone infection    Dead wound tissue    Cancer tissue    Fragile skin, such as from aging or longtime use of topical steroids    Allergy to adhesive    Very poor blood flow to your wound    Wounds close to joints that may reopen because of movement  Your doctor will discuss the risks that apply to you. Make sure to talk with him or her about all of your questions and concerns.  Getting ready for wound VAC  You likely won t need to do much to get ready for wound VAC. In some cases, you may need to wait a while before having this therapy. For example, your doctor may first need to treat an infection in your wound. Dead or damaged tissue may also need to be removed from your wound.  You or a caregiver may need training on how to use the wound VAC device. This is done if you will be able to have your wound vacuum therapy at home. In other cases, you may need to have your wound vacuum therapy in a health care facility.  On the day of your procedure  A health care provider will cover your wound with foam or gauze wound dressing. An adhesive film will be put over the dressing and wound. This seals the wound. The foam connects to a drainage tube, which leads to a vacuum pump. This pump is portable. When the pump is turned on, it draws fluid through the foam and out the drainage tubing. The pump may run constantly, or it may cycle off and on. Your exact setup will depend on the specific type of wound vacuum system that you use.  Managing your wound  You may need the dressing changed about once a day. You may need it changed more or less often, depending on your wound. You or your caregiver may be trained to do this at home. Or it may be  done by a visiting health care provider. Your doctor may prescribe a pain medicine. This is to prevent or reduce pain during the dressing change.  You will likely need to use the wound VAC system for several weeks or months. During this time, you ll carry the portable pump everywhere you go.  Nutrition for wound healing  During this time, make sure you follow a healthy diet. This is needed so the wound can heal and to prevent infection. Your doctor can tell you more about what to include in your diet during this time.  follow up with your doctor if you have a medical condition that led to your wound, such as diabetes. He or she can help you prevent future wounds.  Follow-up care  Your doctor will carefully keep track of your healing. Make sure to keep all follow-up appointments.  When to call your health care provider  Call your health care provider right away if you have any of these:    Fever of 100.4 F (38.0 C) or higher    Increased redness, swelling, or warmth around wound    Increased pain    Bright red blood or blood clots in tubing or the collection chamber of the vacuum

## 2021-06-04 NOTE — PATIENT INSTRUCTIONS - HE
If you do not have a back up plan in place: If the negative pressure wound therapy malfunctions or unable to maintain seal: dressing must be removed and reapplied within 2 hours of the incident. If unable to reapply negative pressure wound dressing, place Normal Saline moistened gauze in wound bed and cover with appropriate dressing to keep wound bed moist.  Change wet-to-dry dressing two times a day until healthcare staff can re-implement negative pressure therapy.   Change canister at least weekly.  [facility] Contact Center can be reached at [phone], 24 hours a day 7 days a week    What is V.A.C.  Therapy?   V.A.C.  Therapy is a medical device system that   promotes wound healing by delivering negative   pressure (a vacuum) to the wound through a   patented dressing and therapy unit creating an   environment that promotes the wound healing   process. This negative pressure helps draw wound   edges together, remove wound fluids and infectious   materials and promote granulation tissue formation   (the connective tissue in healing wounds).   Unlike gauze bandages that merely cover a wound,   V.A.C.  Therapy actively works to help the wound   healing process.   The V.A.C.  Therapy System helps:     Promote wound healing     Provide a moist wound healing environment     Draw wound edges together     Remove fluid and infectious materials     Reduce wound odor     Reduce the need for daily dressing changes   When should I call my clinician when on   V.A.C.  Therapy?   Immediately report to your clinician if you have any   of these symptoms:     Fever over 102      Diarrhea     Headache     Sore throat     Confusion     Sick to your stomach or throwing up     Dizziness or feel faint when you stand up     Redness around the wound     Skin itches or rash present     Wound is sore, red or swollen     Pus or bad smell from the wound     Area in or around wound feels very warm    Vacuum-Assisted Closure of a  Wound  Vacuum-assisted closure (VAC) of a wound is a type of treatment to help wounds heal. It s also known as negative pressure wound therapy. During the treatment, a device lowers air pressure on the wound. This can help the wound heal more quickly.  Understanding the wound VAC system  A wound VAC system has several parts. A foam or gauze dressing is put directly on the wound. The dressing is changed every 24 to 72 hours. An adhesive film covers and seals the dressing and wound. A drainage tube leads from under the adhesive film and connects to a portable vacuum pump. This pump removes air pressure over the wound. It may do this constantly. Or it may do it in cycles. During the treatment, you ll need to carry the portable pump everywhere you go.  Why wound VAC is used  You might need this therapy for a recent traumatic wound. Or you may need it for a chronic wound. This is a wound that does not heal the way it should over time. This can happen with wounds in people who have diabetes. You may need a wound VAC if you ve had a recent skin graft. And you may need a wound VAC for a large wound. Large wounds can take a longer time to heal.  A wound vacuum system may help your wound heal more quickly by:    Draining extra fluid from the wound    Reducing swelling    Reducing bacteria in the wound    Keeping your wound moist and warm    Helping draw together wound edges    Increasing blood flow to your wound    Decreasing inflammation  Wound VAC offers some other advantages over other types of wound care. It may decrease your overall discomfort. The dressings usually need to be changed less often. And they may be easier to keep in position.  Risks of wound VAC  Wound VAC has some rare risks, such as:    Bleeding (which may be severe)    Wound infection    An abnormal connection between the intestinal tract and the skin (enteric fistula)  Proper training in dressing changes can help reduce the risk for these complications.  Also, your doctor will carefully evaluate you to make sure you are a good candidate for the therapy. Certain problems can increase your risk for complications. These include:    Exposed organs or blood vessels    High risk of bleeding from another medical problem    Wound infection    Nearby bone infection    Dead wound tissue    Cancer tissue    Fragile skin, such as from aging or longtime use of topical steroids    Allergy to adhesive    Very poor blood flow to your wound    Wounds close to joints that may reopen because of movement  Your doctor will discuss the risks that apply to you. Make sure to talk with him or her about all of your questions and concerns.  Getting ready for wound VAC  You likely won t need to do much to get ready for wound VAC. In some cases, you may need to wait a while before having this therapy. For example, your doctor may first need to treat an infection in your wound. Dead or damaged tissue may also need to be removed from your wound.  You or a caregiver may need training on how to use the wound VAC device. This is done if you will be able to have your wound vacuum therapy at home. In other cases, you may need to have your wound vacuum therapy in a health care facility.  On the day of your procedure  A health care provider will cover your wound with foam or gauze wound dressing. An adhesive film will be put over the dressing and wound. This seals the wound. The foam connects to a drainage tube, which leads to a vacuum pump. This pump is portable. When the pump is turned on, it draws fluid through the foam and out the drainage tubing. The pump may run constantly, or it may cycle off and on. Your exact setup will depend on the specific type of wound vacuum system that you use.  Managing your wound  You may need the dressing changed about once a day. You may need it changed more or less often, depending on your wound. You or your caregiver may be trained to do this at home. Or it may be  done by a visiting health care provider. Your doctor may prescribe a pain medicine. This is to prevent or reduce pain during the dressing change.  You will likely need to use the wound VAC system for several weeks or months. During this time, you ll carry the portable pump everywhere you go.  Nutrition for wound healing  During this time, make sure you follow a healthy diet. This is needed so the wound can heal and to prevent infection. Your doctor can tell you more about what to include in your diet during this time.  follow up with your doctor if you have a medical condition that led to your wound, such as diabetes. He or she can help you prevent future wounds.  Follow-up care  Your doctor will carefully keep track of your healing. Make sure to keep all follow-up appointments.  When to call your health care provider  Call your health care provider right away if you have any of these:    Fever of 100.4 F (38.0 C) or higher    Increased redness, swelling, or warmth around wound    Increased pain    Bright red blood or blood clots in tubing or the collection chamber of the vacuum

## 2021-06-04 NOTE — TELEPHONE ENCOUNTER
Pt called stating that he received a call over the weekend that he was to get a wound vac today and it was to be applied. Pt stated that the wound vac won't be delivered until tomorrow. No order in chart for wound vac. Is the pt to change his dressing before the wound vac arrives? Need wound vac orders. Is patient to come to the clinic for changes? Please advise.

## 2021-06-05 NOTE — PATIENT INSTRUCTIONS - HE
If you do not have a back up plan in place: If the negative pressure wound therapy malfunctions or unable to maintain seal: dressing must be removed and reapplied within 2 hours of the incident. If unable to reapply negative pressure wound dressing, place Normal Saline moistened gauze in wound bed and cover with appropriate dressing to keep wound bed moist.  Change wet-to-dry dressing two times a day until healthcare staff can re-implement negative pressure therapy.   Change canister at least weekly.  [facility] Contact Center can be reached at [phone], 24 hours a day 7 days a week    What is V.A.C.  Therapy?   V.A.C.  Therapy is a medical device system that   promotes wound healing by delivering negative   pressure (a vacuum) to the wound through a   patented dressing and therapy unit creating an   environment that promotes the wound healing   process. This negative pressure helps draw wound   edges together, remove wound fluids and infectious   materials and promote granulation tissue formation   (the connective tissue in healing wounds).   Unlike gauze bandages that merely cover a wound,   V.A.C.  Therapy actively works to help the wound   healing process.   The V.A.C.  Therapy System helps:     Promote wound healing     Provide a moist wound healing environment     Draw wound edges together     Remove fluid and infectious materials     Reduce wound odor     Reduce the need for daily dressing changes   When should I call my clinician when on   V.A.C.  Therapy?   Immediately report to your clinician if you have any   of these symptoms:     Fever over 102      Diarrhea     Headache     Sore throat     Confusion     Sick to your stomach or throwing up     Dizziness or feel faint when you stand up     Redness around the wound     Skin itches or rash present     Wound is sore, red or swollen     Pus or bad smell from the wound     Area in or around wound feels very warm    Vacuum-Assisted Closure of a  Wound  Vacuum-assisted closure (VAC) of a wound is a type of treatment to help wounds heal. It s also known as negative pressure wound therapy. During the treatment, a device lowers air pressure on the wound. This can help the wound heal more quickly.  Understanding the wound VAC system  A wound VAC system has several parts. A foam or gauze dressing is put directly on the wound. The dressing is changed every 24 to 72 hours. An adhesive film covers and seals the dressing and wound. A drainage tube leads from under the adhesive film and connects to a portable vacuum pump. This pump removes air pressure over the wound. It may do this constantly. Or it may do it in cycles. During the treatment, you ll need to carry the portable pump everywhere you go.  Why wound VAC is used  You might need this therapy for a recent traumatic wound. Or you may need it for a chronic wound. This is a wound that does not heal the way it should over time. This can happen with wounds in people who have diabetes. You may need a wound VAC if you ve had a recent skin graft. And you may need a wound VAC for a large wound. Large wounds can take a longer time to heal.  A wound vacuum system may help your wound heal more quickly by:    Draining extra fluid from the wound    Reducing swelling    Reducing bacteria in the wound    Keeping your wound moist and warm    Helping draw together wound edges    Increasing blood flow to your wound    Decreasing inflammation  Wound VAC offers some other advantages over other types of wound care. It may decrease your overall discomfort. The dressings usually need to be changed less often. And they may be easier to keep in position.  Risks of wound VAC  Wound VAC has some rare risks, such as:    Bleeding (which may be severe)    Wound infection    An abnormal connection between the intestinal tract and the skin (enteric fistula)  Proper training in dressing changes can help reduce the risk for these complications.  Also, your doctor will carefully evaluate you to make sure you are a good candidate for the therapy. Certain problems can increase your risk for complications. These include:    Exposed organs or blood vessels    High risk of bleeding from another medical problem    Wound infection    Nearby bone infection    Dead wound tissue    Cancer tissue    Fragile skin, such as from aging or longtime use of topical steroids    Allergy to adhesive    Very poor blood flow to your wound    Wounds close to joints that may reopen because of movement  Your doctor will discuss the risks that apply to you. Make sure to talk with him or her about all of your questions and concerns.  Getting ready for wound VAC  You likely won t need to do much to get ready for wound VAC. In some cases, you may need to wait a while before having this therapy. For example, your doctor may first need to treat an infection in your wound. Dead or damaged tissue may also need to be removed from your wound.  You or a caregiver may need training on how to use the wound VAC device. This is done if you will be able to have your wound vacuum therapy at home. In other cases, you may need to have your wound vacuum therapy in a health care facility.  On the day of your procedure  A health care provider will cover your wound with foam or gauze wound dressing. An adhesive film will be put over the dressing and wound. This seals the wound. The foam connects to a drainage tube, which leads to a vacuum pump. This pump is portable. When the pump is turned on, it draws fluid through the foam and out the drainage tubing. The pump may run constantly, or it may cycle off and on. Your exact setup will depend on the specific type of wound vacuum system that you use.  Managing your wound  You may need the dressing changed about once a day. You may need it changed more or less often, depending on your wound. You or your caregiver may be trained to do this at home. Or it may be  done by a visiting health care provider. Your doctor may prescribe a pain medicine. This is to prevent or reduce pain during the dressing change.  You will likely need to use the wound VAC system for several weeks or months. During this time, you ll carry the portable pump everywhere you go.  Nutrition for wound healing  During this time, make sure you follow a healthy diet. This is needed so the wound can heal and to prevent infection. Your doctor can tell you more about what to include in your diet during this time.  follow up with your doctor if you have a medical condition that led to your wound, such as diabetes. He or she can help you prevent future wounds.  Follow-up care  Your doctor will carefully keep track of your healing. Make sure to keep all follow-up appointments.  When to call your health care provider  Call your health care provider right away if you have any of these:    Fever of 100.4 F (38.0 C) or higher    Increased redness, swelling, or warmth around wound    Increased pain    Bright red blood or blood clots in tubing or the collection chamber of the vacuum

## 2021-06-05 NOTE — PATIENT INSTRUCTIONS - HE
"    Important Instructions                     How to care for your wound    Cleanse wound with saline sent to you with your supplies or a wound wash found at the pharmacy.      Pat dry the wound with 4\"x4\" gauze      1. 3x weekly and as needed cleanse the wound NS    2. Pat dry    3. Apply Cavilon no sting barrier wipe to the skin surrounding the wound to protect from drainage/maceration    4. Cut strip of drape and apply to skin wear the bridging will occur if needed; plan this area in advance; should not be over bony prominence     5. Cut the foam to fit the size of the wound    6. Apply to wound    7. Cut narrow strip of foam for bridging if needed    8. Cover with drape to obtain air tight seal    9. Cut opening the size of a quarter for where the suction pad will be applied    10. Apply Suction pad    11. 125mmHG suction continuous         Do not get your ulcer wet when you shower.  You can cover it with a cast protector. Cast protectors are available for purchase at [facility]. You may also purchase a cast protector at your local pharmacy.      Good nutrition is important for wound healing.  I recommend increasing your protein.  You can do this through your diet, nutritional supplements, and/or protein powder.    It is ok to continue current wound care treatment/products for the next 2-3 days until new wound care supplies are ordered and arrive. If longer than this please contact our office.    Please call the [facility] before substituting any product    Call our clinic nurse at [phone] if there is an increase in drainage, pain, redness, or the wound size increases.  This maybe a sign of infection and require attention prior to the next appointment        "

## 2021-06-05 NOTE — PATIENT INSTRUCTIONS - HE
If you do not have a back up plan in place: If the negative pressure wound therapy malfunctions or unable to maintain seal: dressing must be removed and reapplied within 2 hours of the incident. If unable to reapply negative pressure wound dressing, place Normal Saline moistened gauze in wound bed and cover with appropriate dressing to keep wound bed moist.  Change wet-to-dry dressing two times a day until healthcare staff can re-implement negative pressure therapy.   Change canister at least weekly.  [facility] Contact Center can be reached at [phone], 24 hours a day 7 days a week    What is V.A.C.  Therapy?   V.A.C.  Therapy is a medical device system that   promotes wound healing by delivering negative   pressure (a vacuum) to the wound through a   patented dressing and therapy unit creating an   environment that promotes the wound healing   process. This negative pressure helps draw wound   edges together, remove wound fluids and infectious   materials and promote granulation tissue formation   (the connective tissue in healing wounds).   Unlike gauze bandages that merely cover a wound,   V.A.C.  Therapy actively works to help the wound   healing process.   The V.A.C.  Therapy System helps:     Promote wound healing     Provide a moist wound healing environment     Draw wound edges together     Remove fluid and infectious materials     Reduce wound odor     Reduce the need for daily dressing changes   When should I call my clinician when on   V.A.C.  Therapy?   Immediately report to your clinician if you have any   of these symptoms:     Fever over 102      Diarrhea     Headache     Sore throat     Confusion     Sick to your stomach or throwing up     Dizziness or feel faint when you stand up     Redness around the wound     Skin itches or rash present     Wound is sore, red or swollen     Pus or bad smell from the wound     Area in or around wound feels very warm    Vacuum-Assisted Closure of a  Wound  Vacuum-assisted closure (VAC) of a wound is a type of treatment to help wounds heal. It s also known as negative pressure wound therapy. During the treatment, a device lowers air pressure on the wound. This can help the wound heal more quickly.  Understanding the wound VAC system  A wound VAC system has several parts. A foam or gauze dressing is put directly on the wound. The dressing is changed every 24 to 72 hours. An adhesive film covers and seals the dressing and wound. A drainage tube leads from under the adhesive film and connects to a portable vacuum pump. This pump removes air pressure over the wound. It may do this constantly. Or it may do it in cycles. During the treatment, you ll need to carry the portable pump everywhere you go.  Why wound VAC is used  You might need this therapy for a recent traumatic wound. Or you may need it for a chronic wound. This is a wound that does not heal the way it should over time. This can happen with wounds in people who have diabetes. You may need a wound VAC if you ve had a recent skin graft. And you may need a wound VAC for a large wound. Large wounds can take a longer time to heal.  A wound vacuum system may help your wound heal more quickly by:    Draining extra fluid from the wound    Reducing swelling    Reducing bacteria in the wound    Keeping your wound moist and warm    Helping draw together wound edges    Increasing blood flow to your wound    Decreasing inflammation  Wound VAC offers some other advantages over other types of wound care. It may decrease your overall discomfort. The dressings usually need to be changed less often. And they may be easier to keep in position.  Risks of wound VAC  Wound VAC has some rare risks, such as:    Bleeding (which may be severe)    Wound infection    An abnormal connection between the intestinal tract and the skin (enteric fistula)  Proper training in dressing changes can help reduce the risk for these complications.  Also, your doctor will carefully evaluate you to make sure you are a good candidate for the therapy. Certain problems can increase your risk for complications. These include:    Exposed organs or blood vessels    High risk of bleeding from another medical problem    Wound infection    Nearby bone infection    Dead wound tissue    Cancer tissue    Fragile skin, such as from aging or longtime use of topical steroids    Allergy to adhesive    Very poor blood flow to your wound    Wounds close to joints that may reopen because of movement  Your doctor will discuss the risks that apply to you. Make sure to talk with him or her about all of your questions and concerns.  Getting ready for wound VAC  You likely won t need to do much to get ready for wound VAC. In some cases, you may need to wait a while before having this therapy. For example, your doctor may first need to treat an infection in your wound. Dead or damaged tissue may also need to be removed from your wound.  You or a caregiver may need training on how to use the wound VAC device. This is done if you will be able to have your wound vacuum therapy at home. In other cases, you may need to have your wound vacuum therapy in a health care facility.  On the day of your procedure  A health care provider will cover your wound with foam or gauze wound dressing. An adhesive film will be put over the dressing and wound. This seals the wound. The foam connects to a drainage tube, which leads to a vacuum pump. This pump is portable. When the pump is turned on, it draws fluid through the foam and out the drainage tubing. The pump may run constantly, or it may cycle off and on. Your exact setup will depend on the specific type of wound vacuum system that you use.  Managing your wound  You may need the dressing changed about once a day. You may need it changed more or less often, depending on your wound. You or your caregiver may be trained to do this at home. Or it may be  done by a visiting health care provider. Your doctor may prescribe a pain medicine. This is to prevent or reduce pain during the dressing change.  You will likely need to use the wound VAC system for several weeks or months. During this time, you ll carry the portable pump everywhere you go.  Nutrition for wound healing  During this time, make sure you follow a healthy diet. This is needed so the wound can heal and to prevent infection. Your doctor can tell you more about what to include in your diet during this time.  follow up with your doctor if you have a medical condition that led to your wound, such as diabetes. He or she can help you prevent future wounds.  Follow-up care  Your doctor will carefully keep track of your healing. Make sure to keep all follow-up appointments.  When to call your health care provider  Call your health care provider right away if you have any of these:    Fever of 100.4 F (38.0 C) or higher    Increased redness, swelling, or warmth around wound    Increased pain    Bright red blood or blood clots in tubing or the collection chamber of the vacuum

## 2021-06-05 NOTE — PATIENT INSTRUCTIONS - HE
If you do not have a back up plan in place: If the negative pressure wound therapy malfunctions or unable to maintain seal: dressing must be removed and reapplied within 2 hours of the incident. If unable to reapply negative pressure wound dressing, place Normal Saline moistened gauze in wound bed and cover with appropriate dressing to keep wound bed moist.  Change wet-to-dry dressing two times a day until healthcare staff can re-implement negative pressure therapy.   Change canister at least weekly.  [facility] can be reached at [phone], 24 hours a day 7 days a week    What is V.A.C.  Therapy?   V.A.C.  Therapy is a medical device system that   promotes wound healing by delivering negative   pressure (a vacuum) to the wound through a   patented dressing and therapy unit creating an   environment that promotes the wound healing   process. This negative pressure helps draw wound   edges together, remove wound fluids and infectious   materials and promote granulation tissue formation   (the connective tissue in healing wounds).   Unlike gauze bandages that merely cover a wound,   V.A.C.  Therapy actively works to help the wound   healing process.   The V.A.C.  Therapy System helps:     Promote wound healing     Provide a moist wound healing environment     Draw wound edges together     Remove fluid and infectious materials     Reduce wound odor     Reduce the need for daily dressing changes   When should I call my clinician when on   V.A.C.  Therapy?   Immediately report to your clinician if you have any   of these symptoms:     Fever over 102      Diarrhea     Headache     Sore throat     Confusion     Sick to your stomach or throwing up     Dizziness or feel faint when you stand up     Redness around the wound     Skin itches or rash present     Wound is sore, red or swollen     Pus or bad smell from the wound     Area in or around wound feels very warm    Vacuum-Assisted Closure of a  Wound  Vacuum-assisted closure (VAC) of a wound is a type of treatment to help wounds heal. It s also known as negative pressure wound therapy. During the treatment, a device lowers air pressure on the wound. This can help the wound heal more quickly.  Understanding the wound VAC system  A wound VAC system has several parts. A foam or gauze dressing is put directly on the wound. The dressing is changed every 24 to 72 hours. An adhesive film covers and seals the dressing and wound. A drainage tube leads from under the adhesive film and connects to a portable vacuum pump. This pump removes air pressure over the wound. It may do this constantly. Or it may do it in cycles. During the treatment, you ll need to carry the portable pump everywhere you go.  Why wound VAC is used  You might need this therapy for a recent traumatic wound. Or you may need it for a chronic wound. This is a wound that does not heal the way it should over time. This can happen with wounds in people who have diabetes. You may need a wound VAC if you ve had a recent skin graft. And you may need a wound VAC for a large wound. Large wounds can take a longer time to heal.  A wound vacuum system may help your wound heal more quickly by:    Draining extra fluid from the wound    Reducing swelling    Reducing bacteria in the wound    Keeping your wound moist and warm    Helping draw together wound edges    Increasing blood flow to your wound    Decreasing inflammation  Wound VAC offers some other advantages over other types of wound care. It may decrease your overall discomfort. The dressings usually need to be changed less often. And they may be easier to keep in position.  Risks of wound VAC  Wound VAC has some rare risks, such as:    Bleeding (which may be severe)    Wound infection    An abnormal connection between the intestinal tract and the skin (enteric fistula)  Proper training in dressing changes can help reduce the risk for these complications.  Also, your doctor will carefully evaluate you to make sure you are a good candidate for the therapy. Certain problems can increase your risk for complications. These include:    Exposed organs or blood vessels    High risk of bleeding from another medical problem    Wound infection    Nearby bone infection    Dead wound tissue    Cancer tissue    Fragile skin, such as from aging or longtime use of topical steroids    Allergy to adhesive    Very poor blood flow to your wound    Wounds close to joints that may reopen because of movement  Your doctor will discuss the risks that apply to you. Make sure to talk with him or her about all of your questions and concerns.  Getting ready for wound VAC  You likely won t need to do much to get ready for wound VAC. In some cases, you may need to wait a while before having this therapy. For example, your doctor may first need to treat an infection in your wound. Dead or damaged tissue may also need to be removed from your wound.  You or a caregiver may need training on how to use the wound VAC device. This is done if you will be able to have your wound vacuum therapy at home. In other cases, you may need to have your wound vacuum therapy in a health care facility.  On the day of your procedure  A health care provider will cover your wound with foam or gauze wound dressing. An adhesive film will be put over the dressing and wound. This seals the wound. The foam connects to a drainage tube, which leads to a vacuum pump. This pump is portable. When the pump is turned on, it draws fluid through the foam and out the drainage tubing. The pump may run constantly, or it may cycle off and on. Your exact setup will depend on the specific type of wound vacuum system that you use.  Managing your wound  You may need the dressing changed about once a day. You may need it changed more or less often, depending on your wound. You or your caregiver may be trained to do this at home. Or it may be  done by a visiting health care provider. Your doctor may prescribe a pain medicine. This is to prevent or reduce pain during the dressing change.  You will likely need to use the wound VAC system for several weeks or months. During this time, you ll carry the portable pump everywhere you go.  Nutrition for wound healing  During this time, make sure you follow a healthy diet. This is needed so the wound can heal and to prevent infection. Your doctor can tell you more about what to include in your diet during this time.  follow up with your doctor if you have a medical condition that led to your wound, such as diabetes. He or she can help you prevent future wounds.  Follow-up care  Your doctor will carefully keep track of your healing. Make sure to keep all follow-up appointments.  When to call your health care provider  Call your health care provider right away if you have any of these:    Fever of 100.4 F (38.0 C) or higher    Increased redness, swelling, or warmth around wound    Increased pain    Bright red blood or blood clots in tubing or the collection chamber of the vacuum

## 2021-06-05 NOTE — PATIENT INSTRUCTIONS - HE
If you do not have a back up plan in place: If the negative pressure wound therapy malfunctions or unable to maintain seal: dressing must be removed and reapplied within 2 hours of the incident. If unable to reapply negative pressure wound dressing, place Normal Saline moistened gauze in wound bed and cover with appropriate dressing to keep wound bed moist.  Change wet-to-dry dressing two times a day until healthcare staff can re-implement negative pressure therapy.   Change canister at least weekly.  [facility] Contact [facility] can be reached at [phone], 24 hours a day 7 days a week    What is V.A.C.  Therapy?   V.A.C.  Therapy is a medical device system that   promotes wound healing by delivering negative   pressure (a vacuum) to the wound through a   patented dressing and therapy unit creating an   environment that promotes the wound healing   process. This negative pressure helps draw wound   edges together, remove wound fluids and infectious   materials and promote granulation tissue formation   (the connective tissue in healing wounds).   Unlike gauze bandages that merely cover a wound,   V.A.C.  Therapy actively works to help the wound   healing process.   The V.A.C.  Therapy System helps:     Promote wound healing     Provide a moist wound healing environment     Draw wound edges together     Remove fluid and infectious materials     Reduce wound odor     Reduce the need for daily dressing changes   When should I call my clinician when on   V.A.C.  Therapy?   Immediately report to your clinician if you have any   of these symptoms:     Fever over 102      Diarrhea     Headache     Sore throat     Confusion     Sick to your stomach or throwing up     Dizziness or feel faint when you stand up     Redness around the wound     Skin itches or rash present     Wound is sore, red or swollen     Pus or bad smell from the wound     Area in or around wound feels very warm    Vacuum-Assisted Closure of a  Wound  Vacuum-assisted closure (VAC) of a wound is a type of treatment to help wounds heal. It s also known as negative pressure wound therapy. During the treatment, a device lowers air pressure on the wound. This can help the wound heal more quickly.  Understanding the wound VAC system  A wound VAC system has several parts. A foam or gauze dressing is put directly on the wound. The dressing is changed every 24 to 72 hours. An adhesive film covers and seals the dressing and wound. A drainage tube leads from under the adhesive film and connects to a portable vacuum pump. This pump removes air pressure over the wound. It may do this constantly. Or it may do it in cycles. During the treatment, you ll need to carry the portable pump everywhere you go.  Why wound VAC is used  You might need this therapy for a recent traumatic wound. Or you may need it for a chronic wound. This is a wound that does not heal the way it should over time. This can happen with wounds in people who have diabetes. You may need a wound VAC if you ve had a recent skin graft. And you may need a wound VAC for a large wound. Large wounds can take a longer time to heal.  A wound vacuum system may help your wound heal more quickly by:    Draining extra fluid from the wound    Reducing swelling    Reducing bacteria in the wound    Keeping your wound moist and warm    Helping draw together wound edges    Increasing blood flow to your wound    Decreasing inflammation  Wound VAC offers some other advantages over other types of wound care. It may decrease your overall discomfort. The dressings usually need to be changed less often. And they may be easier to keep in position.  Risks of wound VAC  Wound VAC has some rare risks, such as:    Bleeding (which may be severe)    Wound infection    An abnormal connection between the intestinal tract and the skin (enteric fistula)  Proper training in dressing changes can help reduce the risk for these complications.  Also, your doctor will carefully evaluate you to make sure you are a good candidate for the therapy. Certain problems can increase your risk for complications. These include:    Exposed organs or blood vessels    High risk of bleeding from another medical problem    Wound infection    Nearby bone infection    Dead wound tissue    Cancer tissue    Fragile skin, such as from aging or longtime use of topical steroids    Allergy to adhesive    Very poor blood flow to your wound    Wounds close to joints that may reopen because of movement  Your doctor will discuss the risks that apply to you. Make sure to talk with him or her about all of your questions and concerns.  Getting ready for wound VAC  You likely won t need to do much to get ready for wound VAC. In some cases, you may need to wait a while before having this therapy. For example, your doctor may first need to treat an infection in your wound. Dead or damaged tissue may also need to be removed from your wound.  You or a caregiver may need training on how to use the wound VAC device. This is done if you will be able to have your wound vacuum therapy at home. In other cases, you may need to have your wound vacuum therapy in a health care facility.  On the day of your procedure  A health care provider will cover your wound with foam or gauze wound dressing. An adhesive film will be put over the dressing and wound. This seals the wound. The foam connects to a drainage tube, which leads to a vacuum pump. This pump is portable. When the pump is turned on, it draws fluid through the foam and out the drainage tubing. The pump may run constantly, or it may cycle off and on. Your exact setup will depend on the specific type of wound vacuum system that you use.  Managing your wound  You may need the dressing changed about once a day. You may need it changed more or less often, depending on your wound. You or your caregiver may be trained to do this at home. Or it may be  done by a visiting health care provider. Your doctor may prescribe a pain medicine. This is to prevent or reduce pain during the dressing change.  You will likely need to use the wound VAC system for several weeks or months. During this time, you ll carry the portable pump everywhere you go.  Nutrition for wound healing  During this time, make sure you follow a healthy diet. This is needed so the wound can heal and to prevent infection. Your doctor can tell you more about what to include in your diet during this time.  follow up with your doctor if you have a medical condition that led to your wound, such as diabetes. He or she can help you prevent future wounds.  Follow-up care  Your doctor will carefully keep track of your healing. Make sure to keep all follow-up appointments.  When to call your health care provider  Call your health care provider right away if you have any of these:    Fever of 100.4 F (38.0 C) or higher    Increased redness, swelling, or warmth around wound    Increased pain    Bright red blood or blood clots in tubing or the collection chamber of the vacuum

## 2021-06-05 NOTE — PROGRESS NOTES
[facility] INFECTIOUS DISEASE CLINIC INITIAL CONSULTATION    Date: 1/16/2020  Patient Name: Levi Soliz   YOB: 1985  MRN: [id number]      ASSESSMENT:  Left foot ulcer: s/p I&D on 12/20 with purulent drainage. Cultures with skin celia. Wound probed to bone but bone appeared ok. Received bactrim from ~12/12-12/29. Wound has been healing off antibiotics for over 3 weeks. Clinically, wound is improving with the vac. Presently, does not appear as though there is an active infection and will continue to monitor off antibiotics.    PLAN:  - no indication for antibiotics presently  - discussed risk for infection to return  - reviewed images of foot/wound healing with patient    Return to clinic PRN.    Myriam Vidal MD  [facility]   Clinic phone: [phone]   Clinic fax: [phone]    ______________________________________________________________________    HISTORY OF PRESENT ILLNESS: Levi Soliz is a 34 y.o. male who is referred for evaluation of left foot ulcer. He's had chronic issues with ulcer in his left foot. It was infected around 5 years ago, debrided in clinic and did course of antibiotics with resolution of symptoms. Continued ulcer on bottom of foot. Woke up last month and suddenly painful with drainage. There was some redness on his foot. He was started on bactrim with improvement. Saw Dr Crandall and underwent I&D on 12/20-wound looked good. Completed around another week of bactrim. Has a wound vac in place and clinically is improving. May require a skin graft to close deficit. He is feeling fine. Compliant with nonweightbearing status. Dr Crandall note from 12/30 without evidence of infection.    Past Medical History:   Diagnosis Date     GERD (gastroesophageal reflux disease)      Spina bifida (H)        Past Surgical History:   Procedure Laterality Date     INCISION AND DRAINAGE OF WOUND Left 12/20/2019    Procedure: INCISION AND DRAINAGE, Left foot;   Surgeon: Jai Crandall DPM;  Location: [facility];  Service: Podiatry       Social History     Socioeconomic History     Marital status: Single     Spouse name: Not on file     Number of children: Not on file     Years of education: Not on file     Highest education level: Not on file   Occupational History     Not on file   Social Needs     Financial resource strain: Not on file     Food insecurity:     Worry: Not on file     Inability: Not on file     Transportation needs:     Medical: Not on file     Non-medical: Not on file   Tobacco Use     Smoking status: Current Every Day Smoker     Packs/day: 0.75     Years: 15.00     Pack years: 11.25     Smokeless tobacco: Never Used   Substance and Sexual Activity     Alcohol use: Not Currently     Comment: occasionly     Drug use: Yes     Frequency: 1.0 times per week     Types: Marijuana     Comment: vape     Sexual activity: Not on file   Lifestyle     Physical activity:     Days per week: Not on file     Minutes per session: Not on file     Stress: Not on file   Relationships     Social connections:     Talks on phone: Not on file     Gets together: Not on file     Attends Jain service: Not on file     Active member of club or organization: Not on file     Attends meetings of clubs or organizations: Not on file     Relationship status: Not on file     Intimate partner violence:     Fear of current or ex partner: Not on file     Emotionally abused: Not on file     Physically abused: Not on file     Forced sexual activity: Not on file   Other Topics Concern     Not on file   Social History Narrative     Not on file   Works as , frequently on his feet.     No family history frequent infections         ROS: All other systems negative except as listed above.      Current Outpatient Medications:      famotidine (PEPCID ORAL), Take by mouth., Disp: , Rfl:       OBJECTIVE:  Vitals:    01/16/20 1257   BP: 116/78   Pulse: 100   Temp: 98.2  F (36.8  C)          GEN: No acute distress.  HEENT: conjunctiva clear, pupils react to light, oropharynx clear, good dentition  RESPIRATORY:  Normal breathing pattern. Clear to auscultation  CARDIOVASCULAR:  Regular rate and rhythm. Normal S1 and S2. No murmur, click, gallop or rub.   ABDOMEN:  Soft, normal bowel sounds, non-tender, no masses, no organomegaly.  MUSCULOSKELETAL: No synovitis.  EXTREMITIES: left foot in brace. Wound vac in place.   SKIN/HAIR/NAILS:  No rashes. No stigmata of endocarditis.  NEURO: no focal findings.     Pertinent labs:            No results found for: CRP      No results found for: ALT, AST, GGT, ALKPHOS, BILITOT      MICROBIOLOGY DATA:  12/19 and 12/20: wound cultures with CoNS, diphtheroids    RADIOLOGY:  12/19 MRI  IMPRESSION:   1.  There is evidence of an old healed fracture of the fifth metatarsal.  2.  There is T2 signal abnormality within the fifth metatarsal head as well as abnormal enhancement. While there is no confluent loss of T1 marrow signal, there is slight cortical irregularity along the undersurface and findings are worrisome for early   developing changes of osteomyelitis.  3. There is a tiny amount of fluid within the fifth MTP joint. A septic arthropathy could have this appearance. There is no         evidence for marrow signal abnormality within the base of the proximal phalanx of the fifth toe.  4. Surrounding edema or cellulitis but no evidence for abscess.  5. Additional edema or cellulitis along the dorsal aspect of the foot.  6. No evidence for tendinous or ligamentous tearing.  7. Fatty infiltration and atrophy of all of the interosseous muscles.  8. Exam otherwise negative.

## 2021-06-06 NOTE — PROGRESS NOTES
"S: Patient is a 35 y/o male, 5'9\", 175 lbs seen at our [facility] for the evaluation and casting for custom foot orthotics on orders from Dr. 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 Patient reports he has underlying diagnosis of spina bifida that affects his left lower limb and that is confirmed in his provider's note.     O: Patient arrived using a rolling knee walker for ambulation and was alert and oriented throughout our visit. Patient reports he had a large callous on the 5th metatarsal head of his left foot due to it's angulation that later became infracted. Patient is still under walking restrictions from Dr. Crandall. Patient's gait was not able to be analyzed during our appointment today due to his restrictions. Patient ROM and MMT scores were all WNL on his right side; his left side results are as follows:    DF: ROM -2 degrees, MMT: 3/5 due to lack of ROM; PF: ROM WNL; MMT: 2/5; Eversion: ROM -2 degrees, MMT 3/5 due to lack of ROM; Inversion ROM and MMT WNL.     Patient displays equinovarus presentation on his left foot and ankle, while the right has slight valgus angulation. Due to MMT and ROM testing I suggested to the patient that we try an AFO at his delivery appointment and he readily agreed.     G: Patient's custom FOs will support and stabilize his foot and ankle complex while offloading his problematic 5th metatarsal to avoid callousing in the future. Patient requires customization as no OTS option will properly support his left foot.     A: I took bilateral biofoam impression of the patient's feet for the fabrication of custom FOs. Patient's FOs will be fabricated using a base of Nickleplast and top cover of athletic trilam with a metatarsal pad. Patient was also measured for a leg length discrepancy and found to have a 1/4\" LLD with the left leg being the shorter. Patient will have a 1/4 lift added to his left FO along with a trial of " a lateral wedge to attempt to slight correct his equinovarus presentation.     P: Patient was asked to schedule his appointment for two weeks time as he was leaving our office.

## 2021-06-06 NOTE — PATIENT INSTRUCTIONS - HE
Please call one of the [facility] locations below to schedule an appointment. If you received a prescription please bring it with you to your appointment. Some locations are limited to what they carry.    Office Locations    [facility]  [address] [place] [address]  Home Medical Equipment, [address]   Phone: [phone]   Orthotics and Prosthetics, [address]   Phone: [phone]    [facility]  Home Medical Equipment  [address], [address], [place] [address]   Phone: [phone]    Orthotics and Prosthetics ([facility])    [address], [address], [place] [address]  Phone: [phone]    [facility]  [address]  [address]   [place] [address]   Phone: [phone]    [facility]  [address]  [place] [address]  Phone: [phone]    [facility]   [address]  [place] [address]  Phone: [phone]    [facility]  [address]  [place] [address]  Phone: [phone]    [facility]  [address] [place] [address]  [address]  [place] [address]  Phone: [phone]    [place]   [address] [place] [address]   Phone: [phone]

## 2021-06-07 NOTE — TELEPHONE ENCOUNTER
Writer spoke with pt. He stopped wearing the walking boot last week. He said a blister developed due to rubbing and now is irritated. No signs or symptoms of infection. Writer informed him to keep it covered until it scabs. If symptoms worsen or increased drainage to call back.     He did not get the orthotics. He needed to the number order them, he was already fitted. Number provided to for [place] [facility].     He had no further questions.

## 2021-06-07 NOTE — TELEPHONE ENCOUNTER
Pt called and left a message that his walking boot is causing rubbing on the ball of his L ankle. There is a possible blister or infection starting.     Pt had a virtual visit on 3/26/20 and he was to transition into orthotics. Left message to confirm if he got his orthotics and informed him he will probably need to send a photo. Requested a call back to discuss.

## 2021-06-07 NOTE — TELEPHONE ENCOUNTER
Tried to call patient to let them know we cancelled appointment with Dr Crandall on 3/25/20 and moved it to nurse bettina on 3/26/20. When you dial phone, it goes right into busy/doesn't work.

## 2021-06-07 NOTE — PATIENT INSTRUCTIONS - HE
Continue to use offloading boot until receives orthotics.  Check feet daily.  Call if any concerns [phone]

## 2021-06-07 NOTE — TELEPHONE ENCOUNTER
Wellness Screening Tool  Symptom Screening:  Do you have a:    Fever?  no    Cough?  no    Shortness of breath?  no  ? If yes, is this a change? no    Skin rash?  no  Within the past 14 days, have you been in contact with someone who:    Is currently being ruled out for COVID-19 (novel coronavirus)?  no    Has tested positive for COVID-19?  no    Has symptoms of a respiratory illness (fever, cough, shortness of breath)?  no  Have you recently traveled to an area with COVID-19 areas: no    Refer to the [facility] Coronavirus webpage for COVID-19 areas:    Within the past 3 weeks, have you been exposed to the following:    Pertussis?  no    Chicken pox?  no    Measles? no  Patient's appointment status: nurse telephone visit  Patient reminded that no visitors are allowed at this time due to COVID-19 concerns. If determined pt has symptoms and is still needed to be seen pt notified they must wear a mask upon entering the clinic.

## 2021-06-07 NOTE — PROGRESS NOTES
"S: Patient is a 35 y/o male, 5'9\", 175 lbs seen at our [facility] for the fitting and delivery of his custom foot orthotics on orders from Dr. 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 Patient reports he has underlying diagnosis of spina bifida that affects his left lower limb and that is confirmed in his provider's note.     O: Patient arrived waling under his own power and was alert and oriented throughout our visit. Patient reports he had a large callous on the 5th metatarsal head of his left foot due to it's angulation that later became infected. Patient ROM and MMT scores were all WNL on his right side; his left side results are as follows:    DF: ROM -2 degrees, MMT: 3/5 due to lack of ROM; PF: ROM WNL; MMT: 2/5; Eversion: ROM -2 degrees, MMT 3/5 due to lack of ROM; Inversion ROM and MMT WNL.     Patient displays equinovarus presentation on his left foot and ankle, while the right has slight valgus angulation. Due to MMT and ROM testing I suggested to the patient that we try an AFO at his delivery appointment and he readily agreed.     G: Patient's custom FOs will support and stabilize his foot and ankle complex while offloading his problematic 5th metatarsal to avoid callousing in the future. Patient requires customization as no OTS option will properly support his left foot.     A: I cut and ground the patient custom FOs to fit into the shoes he provided. I also offloaded and wedged his left FO on the lateral aspect to help minimize his supination and protect the base of his fifth metatarsal. Patient was happy with the fit and function of his FOs after adjustments. Patient and I discussed care, use and break in period necessary for his new FOs. Patient reported he understood all instructions and had no further questions at the end of our visit.     P: Patient was asked to call our office if there are any issues in the future.   "

## 2021-06-10 ENCOUNTER — RECORDS - HEALTHEAST (OUTPATIENT)
Dept: PODIATRY | Facility: CLINIC | Age: 36
End: 2021-06-10

## 2021-06-10 DIAGNOSIS — L97.509 NON-PRESSURE CHRONIC ULCER OF OTHER PART OF UNSPECIFIED FOOT WITH UNSPECIFIED SEVERITY (H): ICD-10-CM

## 2021-06-10 NOTE — PROGRESS NOTES
FOOT AND ANKLE SURGERY/PODIATRY Progress Note        ASSESSMENT:   Keratoma  Adductovarus left foot      TREATMENT:  -After trimming of callus tissue plantar left foot x5 with a #10 blade, no open lesions were noted. We discussed that he has a pressure point under the 5th MPJ which will continue to build callus tissue.     -I recommend that he should continue to use the custom inserts, and also begin use of a pumice stone 2-3x per week. If excessive callus tissue is noted, he should return to see me or Aarti Cerna for debridement.     -All questions invited and answered. He will follow-up as concerns develop.     Jai Crandall DPM  [facility] Podiatry/Foot & Ankle Surgery        HPI: Levi Soliz was seen again today for concerns related to callus build-up along the plantar 5th MPJ left foot. He has not used a pumice stone, but has used the custom inserts in older shoes which he describes as comfortable.       Past Medical History:   Diagnosis Date     GERD (gastroesophageal reflux disease)      Spina bifida (H)        Past Surgical History:   Procedure Laterality Date     INCISION AND DRAINAGE OF WOUND Left 12/20/2019    Procedure: INCISION AND DRAINAGE, Left foot;  Surgeon: Jai Crandall DPM;  Location: [facility];  Service: Podiatry       No Known Allergies      Current Outpatient Medications:      famotidine (PEPCID ORAL), Take by mouth., Disp: , Rfl:     No family history on file.    Social History     Socioeconomic History     Marital status: Single     Spouse name: Not on file     Number of children: Not on file     Years of education: Not on file     Highest education level: Not on file   Occupational History     Not on file   Social Needs     Financial resource strain: Not on file     Food insecurity     Worry: Not on file     Inability: Not on file     Transportation needs     Medical: Not on file     Non-medical: Not on file   Tobacco Use     Smoking status: Current Every Day  Smoker     Packs/day: 0.75     Years: 15.00     Pack years: 11.25     Smokeless tobacco: Never Used   Substance and Sexual Activity     Alcohol use: Not Currently     Comment: occasionly     Drug use: Yes     Frequency: 1.0 times per week     Types: Marijuana     Comment: vape     Sexual activity: Not on file   Lifestyle     Physical activity     Days per week: Not on file     Minutes per session: Not on file     Stress: Not on file   Relationships     Social connections     Talks on phone: Not on file     Gets together: Not on file     Attends Gnosticism service: Not on file     Active member of club or organization: Not on file     Attends meetings of clubs or organizations: Not on file     Relationship status: Not on file     Intimate partner violence     Fear of current or ex partner: Not on file     Emotionally abused: Not on file     Physically abused: Not on file     Forced sexual activity: Not on file   Other Topics Concern     Not on file   Social History Narrative     Not on file       10 point Review of Systems is negative except for left foot callus which is noted in HPI.       Vitals:    07/29/20 0808   BP: 128/70   Pulse: 84   Resp: 20   Temp: 97.8  F (36.6  C)       BMI= There is no height or weight on file to calculate BMI.    OBJECTIVE:  General appearance: Patient is alert and fully cooperative with history & exam.  No sign of distress is noted during the visit.  Vascular: Dorsalis pedis and posterior tibial pulses are palpable. There is pedal hair growth left.  CFT < 3 sec from anterior tibial surface to distal digits left. There is no appreciable edema noted.  Dermatologic: Hyperkeratotic tissue plantar 5th MPJ left foot x5. No open lesions. No erythema.   Neurologic: Diminished to light touch left.   Musculoskeletal: Inverted left foot.     Imaging:     No results found.

## 2021-06-16 PROBLEM — L03.119 CELLULITIS AND ABSCESS OF FOOT EXCLUDING TOE: Status: ACTIVE | Noted: 2019-12-19

## 2021-06-16 PROBLEM — L02.619 CELLULITIS AND ABSCESS OF FOOT EXCLUDING TOE: Status: ACTIVE | Noted: 2019-12-19

## 2021-06-17 NOTE — PATIENT INSTRUCTIONS - HE
Patient Instructions by Brittney Garcia LPN at 1/20/2020 11:20 AM     Author: Brittney Garcia LPN Service: -- Author Type: Licensed Nurse    Filed: 1/20/2020 11:10 AM Encounter Date: 1/20/2020 Status: Signed    : Brittney Garcia LPN (Licensed Nurse)       Patient Education     Negative Pressure Wound Therapy  Negative pressure wound therapy (NPWT) is a type of treatment to help wounds heal. It's also known as vacuum-assisted wound closure. During the treatment, a device lowers air pressure on the wound. This can help the wound heal more quickly.  Understanding NPWT  A NPWT device has several parts. A foam or gauze dressing is put directly on the wound. The dressing is changed every 24 to 72 hours. An adhesive film covers and seals the dressing and wound. A drainage tube leads from under the adhesive film and connects to a portable vacuum pump. This pump removes air pressure over the wound. It may do this constantly. Or it may do it in cycles. During the treatment, youll need to carry the portable pump everywhere you go.  Why NPWT is used  You might need this therapy for a recent traumatic wound. Or you may need it for a chronic wound. This is a wound that does not heal the way it should over time. This can happen with wounds in people who have diabetes. You may need NPWT if youve had a recent skin graft. And you may need it for a large wound. Large wounds can take a longer time to heal.  NPWT may help your wound heal more quickly by:    Draining extra fluid from the wound    Reducing swelling    Reducing bacteria in the wound    Keeping your wound moist and warm    Helping draw together wound edges    Increasing blood flow to your wound    Decreasing inflammation  NPWT offers some other advantages over other types of wound care. It may decrease your overall discomfort. The dressings usually need to be changed less often. And they may be easier to keep in place.  Risks of NPWT  Negative pressure  wound therapy has some rare risks, such as:    Bleeding (which may be severe)    Wound infection    An abnormal connection between the intestinal tract and the skin (enteric fistula). This is usually a problem only if the NPWT is used on an open belly wound that was unable to be closed.  Proper training in dressing changes can help reduce the risk for these complications. Also, your healthcare provider will carefully evaluate you to make sure you are a good candidate for the therapy. Certain problems can increase your risk for complications. These include:    Exposed organs or blood vessels    High risk of bleeding from another medical problem    Wound infection    Nearby bone infection    Dead wound tissue    Cancer tissue    Fragile skin, such as from aging or longtime use of topical steroids    Allergy to adhesive    Very poor blood flow to your wound    Wounds close to joints that may reopen because of movement  Your healthcare provider will discuss the risks that apply to you. Make sure to talk with him or her about all of your questions and concerns.  Getting ready for NPWT  You likely wont need to do much to get ready for your wound therapy. In some cases, you may need to wait a while before having this therapy. For example, your healthcare provider may first need to treat an infection in your wound. Dead or damaged tissue may also need to be removed from your wound.  You or a caregiver may need training on how to use the wound NPWT device. This is done if you will be able to have your wound vacuum therapy at home. In other cases, you may need to have your wound vacuum therapy in a healthcare facility.  On the day of your procedure  A healthcare provider will cover your wound with foam or gauze wound dressing. An adhesive film will be put over the dressing and wound. This seals the wound. The foam connects to a drainage tube, which leads to a vacuum pump. This pump is portable. When the pump is turned on, it  draws fluid through the foam and out the drainage tubing. The pump may run constantly, or it may cycle off and on. Your exact setup will depend on the specific type of wound vacuum system that you use.  Managing your wound  You may need the dressing changed about once a day. You may need it changed more or less often, depending on your wound. You or your caregiver may be trained to do this at home. Or it may be done by a visiting healthcare provider. Your provider may prescribe a pain medicine. This is to prevent or reduce pain during the dressing change.  You will likely need to use the NPWT system for several weeks or months. During this time, youll carry the portable pump everywhere you go.  Nutrition for wound healing  During this time, make sure you follow a healthy diet. This is needed so the wound can heal and to prevent infection. Your healthcare provider can tell you more about what to include in your diet during this time.  Follow up with your healthcare provider if you have a medical condition that led to your wound, such as diabetes. Your provider can help you prevent future wounds.  Follow-up care  Your healthcare provider will carefully keep track of your healing. Make sure to keep all follow-up appointments.  When to call your healthcare provider  Call your healthcare provider right away if you have any of these:    Fever of 100.4 F (38.0 C) or higher, or as directed by your healthcare provider    Increased redness, swelling, or warmth around wound    Increased pain    Bright red blood or blood clots in tubing or the collection chamber of the NPWT device  Date Last Reviewed: 3/1/2019    [id number] [employer]. [address] [place] [address], [place] [address]. All rights reserved. This information is not intended as a substitute for professional medical care. Always follow your healthcare professional's instructions.

## 2021-06-17 NOTE — PATIENT INSTRUCTIONS - HE
Patient Instructions by Jai Crandall DPM at 1/22/2020 12:40 PM     Author: Jai Crandall DPM Service: -- Author Type: Physician    Filed: 1/22/2020  1:48 PM Encounter Date: 1/22/2020 Status: Addendum    : Ana Rivas CMA (Certified Medical Assistant)    Related Notes: Original Note by Jai Crandall DPM (Physician) filed at 1/22/2020  1:29 PM       Hold wound vac. Continue non-weight bearing with knee walker and CAM boot.    - Dressing Application of  Endoform    1. Endoform should be cut to the size of the wound.  It should touch the edges of the ulcer. It is okay if it overlap the edges a small amount.  Then, moisten the Endoform with saline.(If it is easier for you, you can moisten it before laying it in the wound)    2. Cover the wound with Endoform, followed by dry gauze, and secure with roll gauze if needed.     3. Endoform is naturally used by the body over time so you may not find it in the wound when you change your dressing.  If you do find some, gently cleanse the wound with saline. Do not remove the remaining Endoform, which may appear as an off-white to longoria gel, just add Endoform on top.  Usually, more Endoform will need to be added every 5-7 days.     4. Change your top dressing every 1-2 days or as needed depending on drainage.    -Endoform is a collagen dressing created from ovine (sheep) fore-stomach tissue. The collagen extracellular matrix transforms into a soft conforming sheet, which is naturally incorporated into the wound over time.  Collagen dressings are used to stimulate wound healing.

## 2021-06-18 NOTE — PATIENT INSTRUCTIONS - HE
"Patient Instructions by Jai Crandall DPM at 2020  1:20 PM     Author: Jai Crandall DPM Service: -- Author Type: Physician    Filed: 2020  1:51 PM Encounter Date: 2020 Status: Addendum    : Ana Rivas CMA (Certified Medical Assistant)    Related Notes: Original Note by Jai Crandall DPM (Physician) filed at 2020  1:41 PM           Important Instructions     Non-weight bearing left foot. CAM boot for stability.                     How to care for your wound    Cleanse wound with saline sent to you with your supplies or a wound wash found at the pharmacy.      Pat dry the wound with 4\"x4\" gauze      Cut to fit the Endoform, moisten with normal and place in or on the wound.        Cover the wound with [id number] dry gauze.      Wrap the affected area with 4 inch roll gauze.      Secure dressings in place with paper tape.      Change dressinx/week      Do not get your ulcer wet when you shower.  You can cover it with a cast protector. Cast protectors are available for purchase at [facility]. You may also purchase a cast protector at your local pharmacy.      Good nutrition is important for wound healing.  I recommend increasing your protein.  You can do this through your diet, nutritional supplements, and/or protein powder.    It is ok to continue current wound care treatment/products for the next 2-3 days until new wound care supplies are ordered and arrive. If longer than this please contact our office.    Please call the [facility] before substituting any product    Call our clinic nurse at [phone] if there is an increase in drainage, pain, redness, or the wound size increases.  This maybe a sign of infection and require attention prior to the next appointment    - Dressing Application of  Endoform    1. Endoform should be cut to the size of the wound.  It should touch the edges of the ulcer. It is okay if it " overlap the edges a small amount.  Then, moisten the Endoform with saline.(If it is easier for you, you can moisten it before laying it in the wound)    2. Cover the wound with Endoform, followed by dry gauze, and secure with roll gauze if needed.     3. Endoform is naturally used by the body over time so you may not find it in the wound when you change your dressing.  If you do find some, gently cleanse the wound with saline. Do not remove the remaining Endoform, which may appear as an off-white to longoria gel, just add Endoform on top.  Usually, more Endoform will need to be added every 5-7 days.     4. Change your top dressing every 1-2 days or as needed depending on drainage.    -Endoform is a collagen dressing created from ovine (sheep) fore-stomach tissue. The collagen extracellular matrix transforms into a soft conforming sheet, which is naturally incorporated into the wound over time.  Collagen dressings are used to stimulate wound healing.

## 2021-06-28 NOTE — PROGRESS NOTES
Progress Notes by Jai Crandall DPM at 2/5/2020  1:20 PM     Author: Jai Crandall DPM Service: -- Author Type: Physician    Filed: 2/5/2020  2:55 PM Encounter Date: 2/5/2020 Status: Signed    : Jai Crandall DPM (Physician)       FOOT AND ANKLE SURGERY/PODIATRY Progress Note      ASSESSMENT:   Ulceration left foot      TREATMENT:  -The left foot ulceration has improved. I recommend he continue to remain non-weight bearing and use endoform.     -After discussion of risk factors and consent obtained 2% Lidocaine HCL jelly was applied, under clean conditions, the left and foot ulceration(s) were debrided using #15 blade scalpel.  Devitalized and nonviable tissue, along with any fibrin and slough, was removed to improve granulation tissue formation, stimulate wound healing, decrease overall bacteria load, disrupt biofilm formation and decrease edge senescence.  Total excisional debridement was 0.12 sq cm into the subcutaneous tissue with a depth of 0.2 cm.   Ulcers were improved afterwards and .  Measures were as noted on the flow sheet. Patient tolerated this well. Endoform with a gauze dresssing was applied. He will continue to apply Endoform with a gauze dresssing as directed.    -He will follow-up in 3 weeks.      Jai Crandall DPM  [facility]      HPI: Levi Soliz was seen again today for a left foot ulceration. He has remained non-weight bearing and using endoform as directed.       Past Medical History:   Diagnosis Date   ? GERD (gastroesophageal reflux disease)    ? Spina bifida (H)        Past Surgical History:   Procedure Laterality Date   ? INCISION AND DRAINAGE OF WOUND Left 12/20/2019    Procedure: INCISION AND DRAINAGE, Left foot;  Surgeon: Jai Crandall DPM;  Location: [facility];  Service: Podiatry       No Known Allergies      Current Outpatient Medications:   ?  famotidine (PEPCID ORAL), Take by mouth., Disp: , Rfl:     Review of  Systems - 10 point Review of Systems is negative except for left foot ulcer which is noted in HPI.      OBJECTIVE:  Vitals:    02/05/20 1322   BP: 146/80   Pulse: 88   Temp: 98  F (36.7  C)     General appearance: Patient is alert and fully cooperative with history & exam.  No sign of distress is noted during the visit.   Vascular: Dorsalis pedis palpableLeft.  Dermatologic:    VASC Wound 12/26/19 left foot (Active)   Pre Size Length 1 1/22/2020 12:00 PM   Pre Size Width 1.5 1/22/2020 12:00 PM   Pre Size Depth 0.7 1/22/2020 12:00 PM   Pre Total Sq cm 1.5 1/22/2020 12:00 PM   Post Size Length 0.4 2/5/2020  1:00 PM   Post Size Width 0.3 2/5/2020  1:00 PM   Post Total Sq cm 0.12 2/5/2020  1:00 PM   Undermined no 1/22/2020 12:00 PM   Tunneling no 1/22/2020 12:00 PM   Description red 1/22/2020 12:00 PM   Prodcut Used Negative pressure 1/22/2020 12:00 PM       Incision 12/20/19 Surgical Foot Left (Active)   Granular base. No erythema.  Neurologic: Diminished to light touch Left.  Musculoskeletal: Contracted digits noted Left.    Imaging:     No results found.       Picture:

## 2021-06-28 NOTE — PROGRESS NOTES
"Progress Notes by Alicia Matt LPN at 1/17/2020 11:20 AM     Author: Alicia Matt LPN Service: -- Author Type: Licensed Nurse    Filed: 1/17/2020 12:22 PM Encounter Date: 1/17/2020 Status: Signed    : Alicia Matt LPN (Licensed Nurse)           Nurse Visit    Chief Complaint: Patient presents to clinic for assessment and treatment of their left right plantar wound incision.    Dressing on Arrival: wound vac intact and clean , set on 125 mmgh      Patient came in today for wound vac change per order from Dr Raz Vergara . Patient rated pain  0 out of 10. Removed vac and cleansed leah wound with soap and cleansed wound bed with  Normal saline. Applied cavilon barrier to leah wound. Applied drape to leah wound and  piece of black foam to wound. Suction pad applied and running at 127 mmHg. No alarm or leaks noted. Patient tolerated dressing change well.     Allergies: No Known Allergies    Medications:   Current Outpatient Medications:   ?  famotidine (PEPCID ORAL), Take by mouth., Disp: , Rfl:     Vital Signs: /80 (Patient Site: Left Arm, Patient Position: Sitting, Cuff Size: Adult Regular)   Pulse 92   Temp 98  F (36.7  C) (Oral)   Resp 20   Ht 5' 9\" (1.753 m)   BMI 25.84 kg/m        Assessment:    General:  Patient presents to clinic in no apparent distress.  Psychiatric:  Alert and oriented .   Lower extremity:  edema is not present.    Integumentary:  Skin is uniformly warm, dry and pink.    Wound size:   VASC Wound 12/26/19 left foot (Active)   Pre Size Length 1.5 1/17/2020 11:00 AM   Pre Size Width 1.8 1/17/2020 11:00 AM   Pre Size Depth 0.7 1/17/2020 11:00 AM   Pre Total Sq cm 2.7 1/17/2020 11:00 AM   Undermined no 1/17/2020 11:00 AM   Tunneling no 1/17/2020 11:00 AM   Description red, epibole 1/17/2020 11:00 AM   Prodcut Used Negative pressure 1/17/2020 11:00 AM       Incision 12/20/19 Surgical Foot Left (Active)      Undermining is not present.    The periwoundskin is WNL  "         Plan:         1. Patient will follow up on January 20th,with nurse visit         2. Treatment provided will include irrigation and dressings to promote autolytic debridement and will be as listed below     Cleansed with: Normal Saline    Protected skin with: Remedy Skin Repair    Dressings Applied: Foam adhesive and NPWT    Compression Applied to the Left Leg: wound vac set on   125 mmgh      none    Compression Applied to the Right Leg: None    none    Offloading applied: none  Trial Products: no  Provider notified regarding concerns: no  Treatment Changes: no    Educational Barriers: none  Taught Regarding: Activity and Dressing  Teaching Method: Explanation and DC sheet

## 2021-06-28 NOTE — PROGRESS NOTES
Progress Notes by Jai Crandall DPM at 2/26/2020  1:00 PM     Author: Jai Crandall DPM Service: -- Author Type: Physician    Filed: 2/27/2020 10:20 AM Encounter Date: 2/26/2020 Status: Signed    : Jai Crandall DPM (Physician)       FOOT AND ANKLE SURGERY/PODIATRY Progress Note      ASSESSMENT:   Ulceration left foot      TREATMENT:  -The left foot ulceration has resolved. I am pleased with his progress. I recommend he continue to remain non-weight bearing with the knee walker and CAM boot x2 weeks, then gradually increase walking in the CAM boot.     -I have referred him to [facility] for custom inserts to offload the 5th MPJ. We discussed that he will likely continue to build callus tissue at this location and require serial debridement.     -He is discharged from my care at this time and will follow-up with me as concerns develop.       Jai Crandall DPM  [facility]      HPI: Levi Soliz was seen again today for a left foot ulceration. Doing well. He has remained mostly non-weight bearing with the knee walker.      Past Medical History:   Diagnosis Date   ? GERD (gastroesophageal reflux disease)    ? Spina bifida (H)        Past Surgical History:   Procedure Laterality Date   ? INCISION AND DRAINAGE OF WOUND Left 12/20/2019    Procedure: INCISION AND DRAINAGE, Left foot;  Surgeon: Jai Crandall DPM;  [place] [facility] [place]iat[place]       No Known Allergies      Current Outpatient Medications:   ?  famotidine (PEPCID ORAL), Take by mouth., Disp: , Rfl:     Review of Systems - 10 point Review of Systems is negative except for foot ulcer which is noted in HPI.      OBJECTIVE:  Vitals:    02/26/20 1317   BP: 118/70   Pulse: 84   Resp: 16   Temp: 98  F (36.7  C)     General appearance: Patient is alert and fully cooperative with history & exam.  No sign of distress is noted during the visit.   Vascular: Dorsalis pedis palpableLeft.  Dermatologic:     VASC Wound 12/26/19 left foot (Active)   Pre Size Length 0.6 2/26/2020  1:00 PM   Pre Size Width 1.1 2/26/2020  1:00 PM   Pre Size Depth 0.2 2/26/2020  1:00 PM   Pre Total Sq cm 0.66 2/26/2020  1:00 PM   Post Size Length 0 2/26/2020  1:00 PM   Post Size Width 0 2/26/2020  1:00 PM   Post Total Sq cm 0 2/26/2020  1:00 PM   Undermined no 1/22/2020 12:00 PM   Tunneling no 1/22/2020 12:00 PM   Description red 1/22/2020 12:00 PM   Prodcut Used Negative pressure 1/22/2020 12:00 PM       Incision 12/20/19 Surgical Foot Left (Active)     Neurologic: Diminished to light touch Left.  Musculoskeletal: Contracted digits noted Left.    Imaging:     No results found.       Picture:

## 2021-06-28 NOTE — PROGRESS NOTES
"Progress Notes by Alicia Matt LPN at 1/3/2020  8:00 AM     Author: Alciia Matt LPN Service: -- Author Type: Licensed Nurse    Filed: 1/3/2020  9:07 AM Encounter Date: 1/3/2020 Status: Signed    : Alicia Matt LPN (Licensed Nurse)           Nurse Visit    Chief Complaint: Patient presents to clinic for assessment and treatment of their ulcer and and swelling    Dressing on Arrival: wound vac on left planter, set 125 mmgh      Patient came in today for wound vac change per order from Jai Romeo. Patient rated pain 0 out of 10. Removed vac and cleansed leah wound with soap and cleansed wound bed with normal saline. Applied cavilon barrier to leah wound. Applied drape to leah wound and one piece  of black foam to wound. Suction pad applied and running at 125 mmHg. No alarm or leaks noted. Patient tolerated dressing change well.     Allergies: No Known Allergies    Medications:   Current Outpatient Medications:   ?  HYDROcodone-acetaminophen 5-325 mg per tablet, Take 1 tablet by mouth every 4 (four) hours as needed., Disp: 30 tablet, Rfl: 0  ?  HYDROcodone-acetaminophen 5-325 mg per tablet, Take 1 tablet by mouth every 4 (four) hours as needed for pain., Disp: 18 tablet, Rfl: 0  ?  ranitidine (ZANTAC) 150 MG capsule, Take 150 mg by mouth every evening., Disp: , Rfl:     Vital Signs: /70 (Patient Site: Right Arm, Patient Position: Sitting, Cuff Size: Adult Regular)   Pulse 72   Temp 97.9  F (36.6  C) (Oral)   Resp 17   Ht 5' 9\" (1.753 m)   BMI 24.07 kg/m        Assessment:    General:  Patient presents to clinic in no apparent distress.  Psychiatric:  Alert and oriented .   Lower extremity:  edema is present.    Integumentary:  Skin is uniformly warm, dry and pink.    Wound size:   VASC Wound 12/19/19 Left plantar 5th MPJ (Active)   Undermined no  12/30/2019 10:00 AM       VASC Wound 12/26/19 left foot (Active)   Pre Size Length 1.8 1/3/2020  8:00 AM   Pre Size Width 2.3 1/3/2020  " 8:00 AM   Pre Size Depth 1.5 1/3/2020  8:00 AM   Undermined no 1/3/2020  8:00 AM   Tunneling no 1/3/2020  8:00 AM   Description red 1/3/2020  8:00 AM   Prodcut Used Negative pressure 1/3/2020  8:00 AM       Incision 12/20/19 Surgical Foot Left (Active)      Undermining is not present.    The periwoundskin is WNL and macerated          Plan:         1. Patient will follow up on January 6th with nurse visit         2. Treatment provided will include irrigation and dressings to promote autolytic debridement and will be as listed below     Cleansed with: Normal Saline    Protected skin with: 3M Cavilon and Remdy skin repair    Dressings Applied: black foam and NPWT    Compression Applied to the Left Leg: tubular compression      Tubular compression was applied from base of toes to just below the bend of knee    Compression Applied to the Right Leg: None    none    Offloading applied: knee roller  Trial Products: no  Provider notified regarding concerns: no  Treatment Changes: no    Educational Barriers: none  Taught Regarding: Activity, Compression and Dressing  Teaching Method: Explanation and DC sheet

## 2021-06-28 NOTE — PROGRESS NOTES
Progress Notes by Sandra Fry LPN at 1/6/2020  8:00 AM     Author: Sandra Fry LPN Service: -- Author Type: Licensed Nurse    Filed: 1/6/2020  8:34 AM Encounter Date: 1/6/2020 Status: Signed    : Sandra Fry LPN (Licensed Nurse)           Nurse Visit    Chief Complaint: Patient presents to clinic for assement, and treatment of their ulcer    Dressing on Arrival NPWT intact and functioning properly      Allergies: No Known Allergies    Medications:  No current outpatient medications on file.    Vital Signs: /78   Pulse 82   Temp 98  F (36.7  C)   Resp 18     Assessment:    General:  Patient presents to clinic in no apparent distress.  Psychiatric:  Alert and oriented x3.   Lower extremity:  edema is not present.    Integumentary:  Skin is uniformly warm, dry and pink.    Wound size:   VASC Wound 12/19/19 Left plantar 5th MPJ (Active)   Undermined no  12/30/2019 10:00 AM       VASC Wound 12/26/19 left foot (Active)   Pre Size Length 1.5 1/6/2020  7:00 AM   Pre Size Width 1.8 1/6/2020  7:00 AM   Pre Size Depth 1 1/6/2020  7:00 AM   Pre Total Sq cm 2.7 1/6/2020  7:00 AM   Undermined no 1/3/2020  8:00 AM   Tunneling no 1/3/2020  8:00 AM   Description red 1/3/2020  8:00 AM   Prodcut Used Negative pressure 1/3/2020  8:00 AM       Incision 12/20/19 Surgical Foot Left (Active)      Undermining is not present.    The periwoundskin is maceration      Plan:         1. Patient will follow up in in the next few days         2. Treatment provided  to Left foot  the left and foot ulceration(s) and will include irrigation and dressings to promote autolytic debridement and will be as listed below     Cleansed with: Normal Saline    Protected skin with: 3-M Cavilon    Dressings Applied: Negative Pressure Wound Vac    Compression Compression Applied to N/A  N/A    Offloading applied: Rolling knee walker    Trial Products: no  Provider notified regarding concerns: no  Treatment  Changes: no    Educational Barriers: none  Taught regarding: Activity, Compliance and Dressing  Teaching Method: Explanation and DC sheet      Wound product removed: wound vac      Treatment provided:     Cleansed with: normal saline  Protected skin with: 3M NoSting  Applied: picture frame to leah wound  Packed/filled with: 1 piece of black foam and landing pad  Covered with: suction pad  Secured with: vac drape    Patient came in today for wound vac change per order from Dr Crandall. Patient denies any pain. Removed vac and cleansed leah wound with Remedy skin cleanser and cleaned wound bed with saline. Applied cavilon barrier to leah wound. Picture framed wound and filled wound with 1 piece of black foam and applied vac. Running at 125 mmHg and quite, no alarm or leaks noted. Patient tolerated dressing change well. Will follow up wed  for another  visit.

## 2021-06-28 NOTE — PROGRESS NOTES
Progress Notes by Yanick Hanson LPN at 1/10/2020 11:20 AM     Author: Yanick Hanson LPN Service: -- Author Type: Licensed Nurse    Filed: 1/10/2020 12:13 PM Encounter Date: 1/10/2020 Status: Signed    : Yanick Hanson LPN (Licensed Nurse)       Nurse Visit                Chief Complaint: Patient presents to clinic for assement, and treatment of their ulcer    Dressing on Arrival intact with wound vac present, intact and clean. Set to 125mmgh      Allergies: No Known Allergies    Medications:  No current outpatient medications on file.    Vital Signs: /76 (Patient Site: Left Arm, Patient Position: Sitting, Cuff Size: Adult Regular)   Pulse 84   Temp 97.5  F (36.4  C) (Oral)   Resp 16     Assessment:    General:  Patient presents to clinic in no apparent distress.  Psychiatric:  Alert and oriented x3.   Lower extremity:  edema is not present.    Integumentary:  Skin is uniformly warm, dry and pink.    Wound size:   VASC Wound 12/19/19 Left plantar 5th MPJ (Active)   Undermined no  12/30/2019 10:00 AM       VASC Wound 12/26/19 left foot (Active)   Pre Size Length 1.3 1/10/2020 11:00 AM   Pre Size Width 1.7 1/10/2020 11:00 AM   Pre Size Depth 1.1 1/10/2020 11:00 AM   Pre Total Sq cm 2.21 1/10/2020 11:00 AM   Undermined no 1/10/2020 11:00 AM   Tunneling no 1/10/2020 11:00 AM   Description red wound bed 1/10/2020 11:00 AM   Prodcut Used Negative pressure 1/10/2020 11:00 AM       Incision 12/20/19 Surgical Foot Left (Active)      Undermining is not present.    The periwoundskin is WNL with epibole around wound edge      Plan:         1. Patient will follow up in in 3 days         2. Treatment provided  to left planter foot  the left and foot ulceration(s) and will include irrigation and dressings to promote autolytic debridement and will be as listed below     Cleansed with: Normal Saline    Protected skin with: 3-M Cavilon    Dressings Applied: Negative Pressure Wound Vac with black  foam    Compression Compression Applied to Left  Tubular compression size F    Offloading applied: CAM Boot with knee roller    Trial Products: no  Provider notified regarding concerns: no  Treatment Changes: no    Educational Barriers: none  Taught regarding: Activity, Compression, Home Care and Tobacco cessation  Teaching Method: Explanation and DC sheet

## 2021-06-28 NOTE — PROGRESS NOTES
Progress Notes by Jai Crandall DPM at 1/22/2020 12:40 PM     Author: Jai Crandall DPM Service: -- Author Type: Physician    Filed: 1/22/2020  2:23 PM Encounter Date: 1/22/2020 Status: Signed    : Jai Crandall DPM (Physician)       FOOT AND ANKLE SURGERY/PODIATRY Progress Note      ASSESSMENT:   Ulceration left foot        TREATMENT:  -The left foot ulceration has improved, with decreased depth. I recommend he hold the wound vac at this time and begin use of endoform. Continue non-weight bearing.     -After discussion of risk factors and consent obtained 2% Lidocaine HCL jelly was applied, under clean conditions, the left and foot ulceration(s) were debrided using currette and #15 blade scalpel.  Devitalized and nonviable tissue, along with any fibrin and slough, was removed to improve granulation tissue formation, stimulate wound healing, decrease overall bacteria load, disrupt biofilm formation and decrease edge senescence.  Total excisional debridement was 1.5 sq cm into the subcutaneous tissue with a depth of 0.3 cm.   Ulcers were improved afterwards and .  Measures were as noted on the flow sheet. Patient tolerated this well. Endoform with a gauze dresssing was applied. He will continue to apply Endoform with a gauze dresssing as directed.    -He will follow-up in 2-3 weeks.      Jai Crandall DPM  [facility]      HPI: Levi Soliz was seen again today for a left foot ulceration. He has used the wound vac as directed and remained non-weight bearing.      Past Medical History:   Diagnosis Date   ? GERD (gastroesophageal reflux disease)    ? Spina bifida (H)        Past Surgical History:   Procedure Laterality Date   ? INCISION AND DRAINAGE OF WOUND Left 12/20/2019    Procedure: INCISION AND DRAINAGE, Left foot;  Surgeon: Jai Crandall DPM;  Location: [facility];  Service: Podiatry       No Known Allergies      Current Outpatient Medications:    ?  famotidine (PEPCID ORAL), Take by mouth., Disp: , Rfl:     Review of Systems - 10 point Review of Systems is negative except for foot ulcer which is noted in HPI.      OBJECTIVE:  Vitals:    01/22/20 1247   BP: 116/76   Pulse: 100   Resp: 16   Temp: 98.4  F (36.9  C)     General appearance: Patient is alert and fully cooperative with history & exam.  No sign of distress is noted during the visit.   Vascular: Dorsalis pedis palpableLeft.  Dermatologic:    VASC Wound 12/26/19 left foot (Active)   Pre Size Length 1 1/22/2020 12:00 PM   Pre Size Width 1.5 1/22/2020 12:00 PM   Pre Size Depth 0.7 1/22/2020 12:00 PM   Pre Total Sq cm 1.5 1/22/2020 12:00 PM   Undermined no 1/22/2020 12:00 PM   Tunneling no 1/22/2020 12:00 PM   Description red 1/22/2020 12:00 PM   Prodcut Used Negative pressure 1/22/2020 12:00 PM       Incision 12/20/19 Surgical Foot Left (Active)   Granular base. No erythema.   Neurologic: Diminished to light touch Left.  Musculoskeletal: Contracted digits noted Left.    Imaging:     No results found.       Picture:

## 2021-06-28 NOTE — PROGRESS NOTES
Progress Notes by Jai Crandall DPM at 1/8/2020 12:00 PM     Author: Jai Crandall DPM Service: -- Author Type: Physician    Filed: 1/8/2020  1:21 PM Encounter Date: 1/8/2020 Status: Signed    : Jai Crandall DPM (Physician)       FOOT AND ANKLE SURGERY/PODIATRY Progress Note      ASSESSMENT:   Ulceration left foot      TREATMENT:  -The left foot ulceration continues to improve, increased granulation tissue with small area of exposed tendon. No exposed bone. I recommend he continue to use the wound vac and remained non-weight bearing. CAM boot dispensed to be used during the day.    -After discussion of risk factors and consent obtained 2% Lidocaine HCL jelly was applied, under clean conditions, the left and foot ulceration(s) were debrided using currette.  Devitalized and nonviable tissue, along with any fibrin and slough, was removed to improve granulation tissue formation, stimulate wound healing, decrease overall bacteria load, disrupt biofilm formation and decrease edge senescence.  Total excisional debridement was 2.38 sq cm into the muscle/fascia with a depth of 0.5 cm.   Ulcers were improved afterwards and .  Measures were as noted on the flow sheet. Patient tolerated this well. Wound vac applied today.    -He will follow-up in 2 weeks.      Jai Crandall DPM  [facility]      HPI: Levi Soliz was seen again today for a left foot ulceration. He has been using the wound vac as directed and remained non-weight bearing.       Past Medical History:   Diagnosis Date   ? GERD (gastroesophageal reflux disease)    ? Spina bifida (H)        Past Surgical History:   Procedure Laterality Date   ? INCISION AND DRAINAGE OF WOUND Left 12/20/2019    Procedure: INCISION AND DRAINAGE, Left foot;  Surgeon: Jai Crandall DPM;  [facility];  Service: Podiatry       No Known Allergies    No current outpatient medications on file.    Review of Systems -  10 point Review of Systems is negative except for left foot ulcer which is noted in HPI.      OBJECTIVE:  Vitals:    01/08/20 1212   BP: 122/86   Pulse: 86   Resp: 16   Temp: 97.7  F (36.5  C)     General appearance: Patient is alert and fully cooperative with history & exam.  No sign of distress is noted during the visit.   Vascular: Dorsalis pedis palpableLeft.  Dermatologic:    VASC Wound 12/19/19 Left plantar 5th MPJ (Active)   Undermined no  12/30/2019 10:00 AM       VASC Wound 12/26/19 left foot (Active)   Pre Size Length 1.4 1/8/2020 12:00 PM   Pre Size Width 1.7 1/8/2020 12:00 PM   Pre Size Depth 1 1/8/2020 12:00 PM   Pre Total Sq cm 2.38 1/8/2020 12:00 PM   Undermined no 1/8/2020 12:00 PM   Tunneling no 1/8/2020 12:00 PM   Description red wound bed 1/8/2020 12:00 PM   Prodcut Used Negative pressure 1/8/2020 12:00 PM       Incision 12/20/19 Surgical Foot Left (Active)   Left foot ulceration probes to deep tissues, and small area of tendon, no exposed bone. No erythema. Granular tissue.   Neurologic: Diminished to light touch Left.  Musculoskeletal: Contracted digits noted Left.    Imaging:     Xr Orbit Foreign Body Pre Mr    Result Date: 12/20/2019  EXAM: XR ORBIT FOREIGN BODY PRE MR LOCATION: [facility] DATE/TIME: 12/19/2019 3:11 PM INDICATION: pre mri  COMPARISON: None.     Negative orbits. Both eyes are negative for metallic foreign bodies. Metallic density permanent retainer is superimposed over the anterior mandibular teeth.    Xr Foot Left 3 Or More Vws Standing    Result Date: 12/20/2019  Negative for bony destruction or cortical reaction.     Mr Forefoot With Without Contrast Left    Result Date: 12/19/2019  EXAM: MR FOREFOOT W WO CONTRAST LEFT LOCATION: [facility] DATE/TIME: 12/19/2019 4:20 PM INDICATION: Osteomyelitis fifth metatarsal left. COMPARISON: None. TECHNIQUE: Routine. Additional postgadolinium T1 sequences were obtained. IV CONTRAST: Gadavist 8ml FINDINGS:   JOINTS AND BONES: There is evidence of old healed fracture of the fifth metatarsal. This fracture appears well-healed. There is T2 signal abnormality and abnormal enhancement within the fifth metatarsal head but no loss of T1 marrow signal. However, there is slight cortical irregularity along the undersurface margin and findings are worrisome for early developing osteomyelitis but there is no bone destruction present. There is no evidence for abscess. There is a tiny amount of fluid within the fifth  MTP joint and septic arthropathy could have this appearance. The proximal phalanx of the fifth toe appears normal, however. Hammertoe deformities. No evidence for acute fracture. Mild bunion deformity. TENDONS: The Achilles tendon is not included on the field-of-view and cannot be evaluated. Within the foot, the flexor tendons are negative for tendinopathy, tenosynovitis, or tearing. The extensor tendons are negative for tendinopathy, tenosynovitis, or  tearing. The hallucis tendons are negative. LIGAMENTS: The ligament of Lisfranc is intact. The collateral ligament at the MTP joints are all intact. MUSCLES AND SOFT TISSUES: There is significant muscular atrophy of all of the interosseous muscles. The previously described focus of edema/inflammation along the inferolateral margin of the forefoot adjacent to the fifth MTP joint. Additional edema or cellulitis along the dorsal aspect of the foot. No evidence for organized fluid collection or abscess.     1.  There is evidence of an old healed fracture of the fifth metatarsal. 2.  There is T2 signal abnormality within the fifth metatarsal head as well as abnormal enhancement. While there is no confluent loss of T1 marrow signal, there is slight cortical irregularity along the undersurface and findings are worrisome for early developing changes of osteomyelitis. 3. There is a tiny amount of fluid within the fifth MTP joint. A septic arthropathy could have this appearance.  "There is no       evidence for marrow signal abnormality within the base of the proximal phalanx of the fifth toe. 4. Surrounding edema or cellulitis but no evidence for abscess. 5. Additional edema or cellulitis along the dorsal aspect of the foot. 6. No evidence for tendinous or ligamentous tearing. 7. Fatty infiltration and atrophy of all of the interosseous muscles. 8. Exam otherwise negative.     Poc Us Guidance Needle Placement    Result Date: 12/20/2019  Ultrasound was performed as guidance to an anesthesia procedure.  Click \"PACS images\" hyperlink below to view any stored images.  For specific procedure details, view procedure note authored by anesthesia.         Picture:              "

## 2021-06-28 NOTE — PROGRESS NOTES
"Progress Notes by Brittney Garcia LPN at 12/26/2019  8:00 AM     Author: Brittney Garcia LPN Service: -- Author Type: Licensed Nurse    Filed: 12/26/2019  8:30 AM Encounter Date: 12/26/2019 Status: Signed    : Brittney Garcia LPN (Licensed Nurse)       Nurse Visit            Chief Complaint: Patient presents to clinic for assessment and treatment of their ulcer    Dressing on Arrival: gauze        Patient came in today for wound vac change per order from Dr. Crandall. Patient rated pain 0 out of 10. Removed vac and cleansed leah wound with soap and cleansed wound bed with Normal Saline. Applied cavilon barrier to leah wound. Applied drape to leah wound and 2 pieces of black foam to wound. Suction pad applied and running at 125 mmHg. No alarm or leaks noted. Patient tolerated dressing change well.     Allergies: No Known Allergies    Medications:   Current Outpatient Medications:   ?  HYDROcodone-acetaminophen 5-325 mg per tablet, Take 1 tablet by mouth every 4 (four) hours as needed., Disp: 30 tablet, Rfl: 0  ?  ranitidine (ZANTAC) 150 MG capsule, Take 150 mg by mouth every evening., Disp: , Rfl:   ?  sulfamethoxazole-trimethoprim (BACTRIM DS) 800-160 mg per tablet, Take 1 tablet by mouth 2 (two) times a day for 10 days., Disp: 20 tablet, Rfl: 0    Vital Signs: /78 (Patient Site: Left Arm, Patient Position: Sitting, Cuff Size: Adult Regular)   Pulse (!) 104   Temp 98  F (36.7  C) (Oral)   Resp 16   Ht 5' 9\" (1.753 m)   Wt 163 lb (73.9 kg)   BMI 24.07 kg/m        Assessment:    General:  Patient presents to clinic in no apparent distress.  Psychiatric:  Alert and oriented .   Lower extremity:  edema is not present.    Integumentary:  Skin is uniformly warm, dry and pink.    Wound size:   VASC Wound 12/19/19 Left plantar 5th MPJ (Active)       VASC Wound 12/26/19 left foot (Active)   Pre Size Length 1.8 12/26/2019  8:00 AM   Pre Size Width 2.4 12/26/2019  8:00 AM   Pre Size Depth 1.5 " 12/26/2019  8:00 AM   Tunneling 1.9 cm  12/26/2019  8:00 AM   Description red 12/26/2019  8:00 AM   Prodcut Used Gauze;ABD Pad 12/26/2019  8:00 AM       Incision 12/20/19 Surgical Foot Left (Active)      Undermining is not present.    The periwoundskin is red        Plan:         1. Patient will follow up on 12/30/2019         2. Treatment provided will include irrigation and dressings to promote autolytic debridement and will be as listed below     Cleansed with: Normal Saline    Protected skin with: 3-M Cavilon    Dressings Applied: NPWT    Compression Applied to the Left Leg: None      NA    Compression Applied to the Right Leg: None    NA    Offloading applied: RKW  Trial Products: no  Provider notified regarding concerns: no  Treatment Changes: no    Educational Barriers: none  Taught Regarding: Activity, Compliance, Compression and Dressing  Teaching Method: Explanation and Handout

## 2021-06-28 NOTE — PROGRESS NOTES
"Progress Notes by Alicia Matt LPN at 12/30/2019 10:40 AM     Author: Alicia Matt LPN Service: -- Author Type: Licensed Nurse    Filed: 12/30/2019 10:50 AM Encounter Date: 12/30/2019 Status: Signed    : Alicia Matt LPN (Licensed Nurse)           Nurse Visit    Chief Complaint: Patient presents to clinic for assessment and treatment of left plantar surgical wound    Dressing on Arrival: woundvac set 125 mmgh. Intact. left plantar surgical wound      Patient came in today for wound vac change per order from Dr Raz MONTES. Patient rated pain 2 out of 10. Removed vac and cleansed leah wound with soap and cleansed wound bed with normal saline. Applied cavilon barrier to leah wound. Applied drape to leah wound and black foam piece of black foam towound. Suction pad applied and running at 125 mmHg. No alarm or leaks noted. Patient tolerated dressing change well.     Allergies: No Known Allergies    Medications:   Current Outpatient Medications:   ?  HYDROcodone-acetaminophen 5-325 mg per tablet, Take 1 tablet by mouth every 4 (four) hours as needed., Disp: 30 tablet, Rfl: 0  ?  HYDROcodone-acetaminophen 5-325 mg per tablet, Take 1 tablet by mouth every 4 (four) hours as needed for pain., Disp: 18 tablet, Rfl: 0  ?  ranitidine (ZANTAC) 150 MG capsule, Take 150 mg by mouth every evening., Disp: , Rfl:     Vital Signs: /68   Pulse 88   Temp 98.1  F (36.7  C)   Ht 5' 9\" (1.753 m)   Wt 163 lb (73.9 kg)   BMI 24.07 kg/m        Assessment:    General:  Patient presents to clinic in no apparent distress.  Psychiatric:  Alert and oriented .   Lower extremity:  edema is not present.    Integumentary:  Skin is uniformly warm, dry and pink.    Wound size:   VASC Wound 12/19/19 Left plantar 5th MPJ (Active)   Pre Size Length 1.7 12/30/2019  9:05 AM   Pre Size Width 2 12/30/2019  9:05 AM   Pre Size Depth 1.5 12/30/2019  9:05 AM   Pre Total Sq cm 3.4 12/30/2019  9:05 AM   Undermined no  12/30/2019 10:00 AM    "    VASC Wound 12/26/19 left foot (Active)   Pre Size Length 1.8 12/26/2019  8:00 AM   Pre Size Width 2.4 12/26/2019  8:00 AM   Pre Size Depth 1.5 12/26/2019  8:00 AM   Tunneling 1.9 cm  12/26/2019  8:00 AM   Description red 12/26/2019  8:00 AM   Prodcut Used Gauze;ABD Pad 12/26/2019  8:00 AM       Incision 12/20/19 Surgical Foot Left (Active)      Undermining is not present.    The periwoundskin is WNL and macerated          Plan:         1. Patient will follow up on Friday , January 3rd.          2. Treatment provided will include irrigation and dressings to promote autolytic debridement and will be as listed below     Cleansed with: Normal Saline    Protected skin with: 3-M Cavilon    Dressings Applied: clear drape and black foam into wound    Compression Applied to the Left Leg: None      none    Compression Applied to the Right Leg: None    none    Offloading applied: knee roller  Trial Products: no  Provider notified regarding concerns: no  Treatment Changes: no    Educational Barriers: none  Taught Regarding: Activity and Dressing  Teaching Method: Explanation and DC sheet

## 2021-06-28 NOTE — PROGRESS NOTES
"Progress Notes by Brittney Garcia LPN at 1/20/2020 11:20 AM     Author: Brittney Garcia LPN Service: -- Author Type: Licensed Nurse    Filed: 1/29/2020 11:50 AM Encounter Date: 1/20/2020 Status: Signed    : Brittney Garcia LPN (Licensed Nurse)       Nurse Visit            Chief Complaint: Patient presents to clinic for assessment and treatment of their ulcer    Dressing on Arrival: NPWT    Patient came in today for wound vac change per order from Dr. Crandall. Patient rated pain 0 out of 10. Removed vac and cleansed leah wound with soap and cleansed wound bed with normal saline. Applied cavilon barrier to leah wound. Applied drape to leah wound and 1 piece of black foam to wound. Suction pad applied and running at 125 mmHg. No alarm or leaks noted. Patient tolerated dressing change well.     Allergies: No Known Allergies    Medications:   Current Outpatient Medications:   ?  famotidine (PEPCID ORAL), Take by mouth., Disp: , Rfl:     Vital Signs: /70 (Patient Site: Left Arm, Patient Position: Sitting, Cuff Size: Adult Regular)   Pulse 100   Temp 98.6  F (37  C) (Oral)   Resp 16   Ht 5' 9\" (1.753 m)   Wt 175 lb (79.4 kg)   BMI 25.84 kg/m        Assessment:    General:  Patient presents to clinic in no apparent distress.  Psychiatric:  Alert and oriented .   Lower extremity:  edema is not present.    Integumentary:  Skin is uniformly warm, dry and pink.    Wound size:   VASC Wound 12/26/19 left foot (Active)   Pre Size Length 1 1/22/2020 12:00 PM   Pre Size Width 1.5 1/22/2020 12:00 PM   Pre Size Depth 0.7 1/22/2020 12:00 PM   Pre Total Sq cm 1.5 1/22/2020 12:00 PM   Undermined no 1/22/2020 12:00 PM   Tunneling no 1/22/2020 12:00 PM   Description red 1/22/2020 12:00 PM   Prodcut Used Negative pressure 1/22/2020 12:00 PM       Incision 12/20/19 Surgical Foot Left (Active)      Undermining is not present.    The periwoundskin is macerated        Plan:         1. Patient will follow up on " 1/22/2020         2. Treatment provided will include irrigation and dressings to promote autolytic debridement and will be as listed below     Cleansed with: Normal Saline    Protected skin with: 3-M Cavilon    Dressings Applied: NPWT    Compression Applied to the Left Leg: None      NA    Compression Applied to the Right Leg: None    NA    Offloading applied: CAM Boot  Trial Products: no  Provider notified regarding concerns: no  Treatment Changes: no    Educational Barriers: none  Taught Regarding: Activity, Compliance and Dressing  Teaching Method: Explanation and Handout

## 2021-06-28 NOTE — PROGRESS NOTES
Progress Notes by Gaston Mancera RN at 1/13/2020 11:40 AM     Author: Gaston Mancera RN Service: -- Author Type: Registered Nurse    Filed: 1/13/2020 12:41 PM Encounter Date: 1/13/2020 Status: Signed    : Gaston Mancera RN (Registered Nurse)           Left plantar foot              Nurse Visit    Chief Complaint: Patient presents to clinic for assement, and treatment of their ulcer    Dressing on Arrival : wound vac at 125mmhg continuous intact.     Allergies: No Known Allergies    Medications: No current outpatient medications on file.    Vital Signs: /80   Pulse 80   Temp 97.7  F (36.5  C)   Resp 18       Assessment:    General:  Patient presents to clinic in no apparent distress.  Psychiatric:  Alert and oriented x3.   Lower extremity:  edema is present.    Integumentary:  Skin is uniformly warm, dry and pink.    Wound size:   VASC Wound 12/19/19 Left plantar 5th MPJ (Active)   Undermined no  12/30/2019 10:00 AM       VASC Wound 12/26/19 left foot (Active)   Pre Size Length 1.3 1/13/2020 11:00 AM   Pre Size Width 1.8 1/13/2020 11:00 AM   Pre Size Depth 1 1/13/2020 11:00 AM   Pre Total Sq cm 2.34 1/13/2020 11:00 AM   Undermined no 1/10/2020 11:00 AM   Tunneling no 1/10/2020 11:00 AM   Description red wound bed 1/10/2020 11:00 AM   Prodcut Used Negative pressure 1/10/2020 11:00 AM       Incision 12/20/19 Surgical Foot Left (Active)      Undermining is not present.    The periwoundskin is maceration      Plan:         1. Patient will f/u in 2 days with nurse visit.          2. Treatment provided will include irrigation and dressings to promote autolytic debridement and will be as listed below     Cleansed with: Normal Saline    Protected skin with: 3-M Cavilon    Dressings Applied: Wound vac-1piece foam in wound. Bridged over to top of foot. 125mmhg continuous suction.     Compression Applied to the Left Leg: None    Compression Applied to the Right Leg: None    Offloading applied: CAM boot and rolling knee  walker.     Trial Products: no  Provider notified regarding concerns: no  Treatment Changes: no    Educational Barriers: none  Taught Regarding: Activity, Compliance and Dressing  Teaching Method: Explanation, Handout and Demo

## 2021-06-28 NOTE — PROGRESS NOTES
Progress Notes by Kari Dorman PA-C at 12/12/2019  3:20 PM     Author: Kari Dorman PA-C Service: -- Author Type: Physician Assistant    Filed: 12/12/2019  4:06 PM Encounter Date: 12/12/2019 Status: Signed    : Kari Dorman PA-C (Physician Assistant)         Chief Complaint   Patient presents with   ? Callouses     infected on Lt foot, was on antibiotic for 2 days, no improvement       HPI:  Levi Soliz is a 34 y.o. male who complains of moderate L foot pain and purulent drainage onset 4 days ago. Pt has a callous on the bottom of his L foot that has been there for 12 years. It has gotten infected twice in the past as well. Drainage is coming from the callous. Pt did an E-visit for this 2 days ago and was prescribed Keflex 500 mg three times a day x 10 days and has been taking this as directed. He reports pain has improved but he now has erythema on his foot. It is painful to walk on the affected foot. Symptoms are constant in duration. Denies fever/chills.    Problem List:  There are no relevant problems documented for this patient.      No past medical history on file.    Social History     Tobacco Use   ? Smoking status: Current Every Day Smoker   ? Smokeless tobacco: Never Used   Substance Use Topics   ? Alcohol use: Not on file       Review of Systems   Constitutional: Negative for chills and fever.   Respiratory: Negative for shortness of breath.    Cardiovascular: Negative for chest pain.   Gastrointestinal: Negative for abdominal pain, diarrhea, nausea and vomiting.   Musculoskeletal: Positive for arthralgias.   Skin: Negative for rash.        Erythema, drainage from callous   All other systems reviewed and are negative.      Vitals:    12/12/19 1531   BP: 138/89   Pulse: 94   Resp: 12   Temp: 97.8  F (36.6  C)   TempSrc: Oral   SpO2: 96%   Weight: 166 lb 14.4 oz (75.7 kg)       Physical Exam   Constitutional: He appears well-developed and well-nourished.   Non-toxic appearance.   HENT:   Head: Normocephalic and atraumatic.   Cardiovascular: Normal rate, regular rhythm and normal heart sounds.   Pulses:       Dorsalis pedis pulses are 2+ on the left side.   Pulmonary/Chest: Effort normal and breath sounds normal.   Musculoskeletal:      Left foot: Tenderness present.        Feet:    Neurological: He is alert.   Skin: Skin is warm and dry. There is erythema.   Psychiatric: He has a normal mood and affect.       Labs:  No results found for this or any previous visit (from the past 72 hour(s)).    Radiology:    No results found.    Clinical Decision Making:    Cellulitis has developed around infected callous. No fever or systemic signs/symptoms. Advised pt to continue Keflex, will add Bactrim. Referred to podiatry for follow-up. No cutaneous abscess amenable to I & D at this time.    At the end of the encounter, I discussed results, diagnosis, medications. Discussed red flags for immediate return to clinic/ER, as well as indications for follow up if no improvement. Patient understood and agreed to plan. Patient was stable for discharge.    1. Cellulitis of left foot  sulfamethoxazole-trimethoprim (BACTRIM DS) 800-160 mg per tablet    Ambulatory referral to Podiatry   2. Callus of foot  Ambulatory referral to Podiatry         No follow-ups on file.    CHRISTY North, PA-C  [facility]

## 2021-09-09 ENCOUNTER — OFFICE VISIT (OUTPATIENT)
Dept: PODIATRY | Facility: CLINIC | Age: 36
End: 2021-09-09
Payer: COMMERCIAL

## 2021-09-09 VITALS — BODY MASS INDEX: 24.34 KG/M2 | HEIGHT: 70 IN | OXYGEN SATURATION: 97 % | HEART RATE: 93 BPM | WEIGHT: 170 LBS

## 2021-09-09 DIAGNOSIS — M21.6X9 ACQUIRED CAVUS FOOT DEFORMITY: ICD-10-CM

## 2021-09-09 DIAGNOSIS — L57.0 KERATOMA: Primary | ICD-10-CM

## 2021-09-09 PROCEDURE — 11055 PARING/CUTG B9 HYPRKER LES 1: CPT | Performed by: PODIATRIST

## 2021-09-09 PROCEDURE — 99213 OFFICE O/P EST LOW 20 MIN: CPT | Mod: 25 | Performed by: PODIATRIST

## 2021-09-09 ASSESSMENT — PAIN SCALES - GENERAL: PAINLEVEL: MODERATE PAIN (5)

## 2021-09-09 ASSESSMENT — MIFFLIN-ST. JEOR: SCORE: 1702.36

## 2021-09-09 NOTE — PATIENT INSTRUCTIONS
What are Prescription Custom Orthotics?  Custom orthotics are specially-made devices designed to support and comfort your feet. Prescription orthotics are crafted for you and no one else. They match the contours of your feet precisely and are designed for the way you move. Orthotics are only manufactured after a podiatrist has conducted a complete evaluation of your feet, ankles, and legs, so the orthotic can accommodate your unique foot structure and pathology.  Prescription orthotics are divided into two categories:    Functional orthotics are designed to control abnormal motion. They may be used to treat foot pain caused by abnormal motion; they can also be used to treat injuries such as shin splints or tendinitis. Functional orthotics are usually crafted of a semi-rigid material such as plastic or graphite.    Accommodative orthotics are softer and meant to provide additional cushioning and support. They can be used to treat diabetic foot ulcers, painful calluses on the bottom of the foot, and other uncomfortable conditions.  Podiatrists use orthotics to treat foot problems such as plantar fasciitis, bursitis, tendinitis, diabetic foot ulcers, and foot, ankle, and heel pain. Clinical research studies have shown that podiatrist-prescribed foot orthotics decrease foot pain and improve function.  Orthotics typically cost more than shoe inserts purchased in a retail store, but the additional cost is usually well worth it. Unlike shoe inserts, orthotics are molded to fit each individual foot, so you can be sure that your orthotics fit and do what they're supposed to do. Prescription orthotics are also made of top-notch materials and last many years when cared for properly. Insurance often helps pay for prescription orthotics.  What are Shoe Inserts?   You've seen them at the grocery store and at the mall. You've probably even seen them on TV and online. Shoe inserts are any kind of non-prescription foot support  designed to be worn inside a shoe. Pre-packaged, mass produced, arch supports are shoe inserts. So are the  custom-made  insoles and foot supports that you can order online or at retail stores. Unless the device has been prescribed by a doctor and crafted for your specific foot, it's a shoe insert, not a custom orthotic device--despite what the ads might say.  Shoe inserts can be very helpful for a variety of foot ailments, including flat arches and foot and leg pain. They can cushion your feet, provide comfort, and support your arches, but they can't correct biomechanical foot problems or cure long-standing foot issues.  The most common types of shoe inserts are:    Arch supports: Some people have high arches. Others have low arches or flat feet. Arch supports generally have a  bumped-up  appearance and are designed to support the foot's natural arch.     Insoles: Insoles slip into your shoe to provide extra cushioning and support. Insoles are often made of gel, foam, or plastic.     Heel liners: Heel liners, sometimes called heel pads or heel cups, provide extra cushioning in the heel region. They may be especially useful for patients who have foot pain caused by age-related thinning of the heels' natural fat pads.     Foot cushions: Do your shoes rub against your heel or your toes? Foot cushions come in many different shapes and sizes and can be used as a barrier between you and your shoe.  Choosing an Over-the-Counter Shoe Insert  Selecting a shoe insert from the wide variety of devices on the market can be overwhelming. Here are some podiatrist-tested tips to help you find the insert that best meets your needs:    Consider your health. Do you have diabetes? Problems with circulation? An over-the-counter insert may not be your best bet. Diabetes and poor circulation increase your risk of foot ulcers and infections, so schedule an appointment with a podiatrist. He or she can help you select a solution that won't  cause additional health problems.     Think about the purpose. Are you planning to run a marathon, or do you just need a little arch support in your work shoes? Look for a product that fits your planned level of activity.     Bring your shoes. For the insert to be effective, it has to fit into your shoes. So bring your sneakers, dress shoes, or work boots--whatever you plan to wear with your insert. Look for an insert that will fit the contours of your shoe.     Try them on. If all possible, slip the insert into your shoe and try it out. Walk around a little. How does it feel? Don't assume that feelings of pressure will go away with continued wear. (If you can't try the inserts at the store, ask about the store's return policy and hold on to your receipt.)    Please call one of the [facility] locations below to schedule an appointment. If you received a prescription please bring it with you to your appointment. Some locations are limited to what they carry.    Office Locations    [facility]  [address] [place] [address]  Home Medical Equipment, [address]   Phone: [phone]   Orthotics and Prosthetics, [address]   Phone: [phone]    [facility]  Home Medical Equipment  [address], [place] [address]   Phone: [phone]    Orthotics and Prosthetics ([facility])    [address], [address], [place] [address]  Phone: [phone]    [facility]  [address]   [place] [address]   Phone: [phone]    [facility]  [address]  [place] [address]  Phone: [phone]    [facility]   [address]  [place] [address]  Phone: [phone]    [facility]  [address]  [place] [address]  Phone: [phone]    [facility]  [address]  [place] [address]  Phone: [phone]    [address] [place] [address]   Phone: [phone]

## 2021-09-09 NOTE — PROGRESS NOTES
"FOOT AND ANKLE SURGERY/PODIATRY Progress Note        ASSESSMENT:   Intractable plantar keratoma left foot  Cavus foot deformity    HPI: Levi Soliz was seen again today complaining of a painful reoccurring callus on the bottom of the left foot.  The patient stated that is quite painful with weightbearing and ambulation.  He attempts to shave this callus down periodically with a pumice stone.  He describes it as a sharp pain which is relieved with nonweightbearing.  He has not had any recent trauma to the left foot.  He has not noticed any redness, swelling, drainage or bleeding.    Past Medical History:   Diagnosis Date     GERD (gastroesophageal reflux disease)      NO ACTIVE PROBLEMS      Spina bifida (H)         Past Surgical History:   Procedure Laterality Date     INCISION AND DRAINAGE OF WOUND Left 12/20/2019    Procedure: INCISION AND DRAINAGE, Left foot;  Surgeon: Jai Crandall DPM;  Location: [facility];  Service: Podiatry       No Known Allergies      Current Outpatient Medications:      GAUZE PADS & DRESSINGS 4\"X4\" PADS, apply over wound for 2 weeks after silvadene applied (Patient not taking: Reported on 9/9/2021), Disp: 2 weeks, Rfl: 0     SILVADENE 1 % EX CREA, apply to wound twice daily with dressing changes (Patient not taking: Reported on 9/9/2021), Disp: 2 weeks, Rfl: 0    History reviewed. No pertinent family history.    Social History     Socioeconomic History     Marital status: Single     Spouse name: Not on file     Number of children: Not on file     Years of education: Not on file     Highest education level: Not on file   Occupational History     Not on file   Tobacco Use     Smoking status: Current Every Day Smoker     Packs/day: 0.75     Years: 15.00     Pack years: 11.25     Types: Cigarettes     Smokeless tobacco: Never Used   Substance and Sexual Activity     Alcohol use: Not Currently     Comment: Alcoholic Drinks/day: occasionly     Drug use: Yes     Frequency: 1.0 " "times per week     Types: Marijuana     Comment: Drug use: vape     Sexual activity: Not on file   Other Topics Concern     Not on file   Social History Narrative     Not on file     Social Determinants of Health     Financial Resource Strain:      Difficulty of Paying Living Expenses:    Food Insecurity:      Worried About Running Out of Food in the Last Year:      Ran Out of Food in the Last Year:    Transportation Needs:      Lack of Transportation (Medical):      Lack of Transportation (Non-Medical):    Physical Activity:      Days of Exercise per Week:      Minutes of Exercise per Session:    Stress:      Feeling of Stress :    Social Connections:      Frequency of Communication with Friends and Family:      Frequency of Social Gatherings with Friends and Family:      Attends Church Services:      Active Member of Clubs or Organizations:      Attends Club or Organization Meetings:      Marital Status:    Intimate Partner Violence:      Fear of Current or Ex-Partner:      Emotionally Abused:      Physically Abused:      Sexually Abused:        10 point Review of Systems is negative     Pulse 93   Ht 1.778 m (5' 10\")   Wt 77.1 kg (170 lb)   SpO2 97%   BMI 24.39 kg/m      BMI= Body mass index is 24.39 kg/m .    OBJECTIVE:  General appearance: Patient is alert and fully cooperative with history & exam.  No sign of distress is noted during the visit.  Vascular: Dorsalis pedis and posterior tibial pulses are palpable. There is pedal hair growth bilaterally.  CFT < 3 sec from anterior tibial surface to distal digits bilaterally. There is no appreciable edema noted.  Dermatologic: Large round thick hyperkeratotic nucleated lesion subfourth metatarsal head left foot.  Turgor and texture are within normal limits. No coloration or temperature changes. No primary or secondary lesions noted.  Neurologic: All epicritic and proprioceptive sensations are grossly intact bilaterally.  Musculoskeletal: All active and " passive ankle, subtalar, midtarsal, and 1st MPJ range of motion are grossly intact without pain or crepitus, with the exception of none. Manual muscle strength is within normal limits bilaterally. All dorsiflexors, plantarflexors, invertors, evertors are intact bilaterally. Tenderness present to subfourth metatarsal head left foot on palpation.  No tenderness to bilateral feet or ankles with range of motion.  Increased height of the medial longitudinal arch noted bilaterally.  Calf is soft/non-tender without warmth/induration    Imaging:         No results found.         TREATMENT:  Debrided the hyperkeratotic lesion left foot.  I recommended a pair of orthotics with a metatarsal raise.  The patient is to return to the clinic as needed.        Jeb Boyd; WOO  [facility] Foot & Ankle Surgery/Podiatry

## 2021-12-12 ENCOUNTER — HEALTH MAINTENANCE LETTER (OUTPATIENT)
Age: 36
End: 2021-12-12

## 2022-05-24 ENCOUNTER — OFFICE VISIT (OUTPATIENT)
Dept: PODIATRY | Facility: CLINIC | Age: 37
End: 2022-05-24
Payer: COMMERCIAL

## 2022-05-24 VITALS — HEIGHT: 70 IN | BODY MASS INDEX: 24.34 KG/M2 | WEIGHT: 170 LBS | OXYGEN SATURATION: 97 % | HEART RATE: 118 BPM

## 2022-05-24 DIAGNOSIS — L84 PRE-ULCERATIVE CALLUSES: Primary | ICD-10-CM

## 2022-05-24 DIAGNOSIS — M21.6X2 ACQUIRED LEFT HINDFOOT VARUS: ICD-10-CM

## 2022-05-24 DIAGNOSIS — M21.6X9 ACQUIRED CAVUS FOOT DEFORMITY: ICD-10-CM

## 2022-05-24 PROCEDURE — 99213 OFFICE O/P EST LOW 20 MIN: CPT | Performed by: PODIATRIST

## 2022-05-24 RX ORDER — CEPHALEXIN 500 MG/1
500 CAPSULE ORAL 2 TIMES DAILY
Qty: 20 CAPSULE | Refills: 0 | Status: SHIPPED | OUTPATIENT
Start: 2022-05-24 | End: 2022-06-03

## 2022-05-24 ASSESSMENT — PAIN SCALES - GENERAL: PAINLEVEL: NO PAIN (1)

## 2022-05-24 NOTE — LETTER
"    5/24/2022         RE: Levi Soliz  [address] [address]        Dear Colleague,    Thank you for referring your patient, Levi Soliz, to the [facility]. Please see a copy of my visit note below.    FOOT AND ANKLE SURGERY/PODIATRY Progress Note        ASSESSMENT:   Preulcerative lesion left foot  Rear foot varus  Cavus foot deformity    HPI: Levi Soliz was seen again today complaining of a painful reoccurring callus on the lateral aspect  of the left foot.  The patient stated that he became aware of this callus because he noticed some drainage in his socks.  He has very little pain.  He has not had any redness, swelling or bleeding.  He denies any recent trauma to the left.  He has been wearing orthotics but he has not had a new pair for the past 2 years.  He became concerned when he saw the drainage because he did not want to have an infection that was not being treated appropriately.       Past Medical History:   Diagnosis Date     GERD (gastroesophageal reflux disease)      NO ACTIVE PROBLEMS      Spina bifida (H)         Past Surgical History:   Procedure Laterality Date     INCISION AND DRAINAGE OF WOUND Left 12/20/2019    Procedure: INCISION AND DRAINAGE, Left foot;  Surgeon: Jai Crandall DPM;  Location: [facility];  Service: Podiatry       No Known Allergies      Current Outpatient Medications:      GAUZE PADS & DRESSINGS 4\"X4\" PADS, apply over wound for 2 weeks after silvadene applied (Patient not taking: Reported on 9/9/2021), Disp: 2 weeks, Rfl: 0     SILVADENE 1 % EX CREA, apply to wound twice daily with dressing changes (Patient not taking: Reported on 9/9/2021), Disp: 2 weeks, Rfl: 0    No family history on file.    Social History     Socioeconomic History     Marital status: Single     Spouse name: Not on file     Number of children: Not on file     Years of education: Not on file     Highest education level: Not on file "   Occupational History     Not on file   Tobacco Use     Smoking status: Current Every Day Smoker     Packs/day: 0.75     Years: 15.00     Pack years: 11.25     Types: Cigarettes     Smokeless tobacco: Never Used   Substance and Sexual Activity     Alcohol use: Not Currently     Comment: Alcoholic Drinks/day: occasionly     Drug use: Yes     Frequency: 1.0 times per week     Types: Marijuana     Comment: Drug use: vape     Sexual activity: Not on file   Other Topics Concern     Not on file   Social History Narrative     Not on file     Social Determinants of Health     Financial Resource Strain: Not on file   Food Insecurity: Not on file   Transportation Needs: Not on file   Physical Activity: Not on file   Stress: Not on file   Social Connections: Not on file   Intimate Partner Violence: Not on file   Housing Stability: Not on file       10 point Review of Systems is negative except as mentioned above.      There were no vitals taken for this visit.    BMI= There is no height or weight on file to calculate BMI.    OBJECTIVE:  General appearance: Patient is alert and fully cooperative with history & exam.  No sign of distress is noted during the visit.  Vascular: Dorsalis pedis and posterior tibial pulses are palpable. There is pedal hair growth bilaterally.  CFT < 3 sec from anterior tibial surface to distal digits bilaterally. There is no appreciable edema noted.  Dermatologic: Large round thick hyperkeratotic nucleated and slightly macerated lesion on the lateral aspect of the left foot near the base of the fifth metatarsal.  No edema or erythema noted.  No active drainage or bleeding noted.  Turgor and texture are within normal limits. No coloration or temperature changes. No primary or secondary lesions noted.  Neurologic: All epicritic and proprioceptive sensations are grossly intact bilaterally.  Musculoskeletal: All active and passive ankle, subtalar, midtarsal, and 1st MPJ range of motion are grossly intact  without pain or crepitus, with the exception of none. Manual muscle strength is within normal limits bilaterally. All dorsiflexors, plantarflexors, invertors, evertors are intact bilaterally.  No tenderness present to  the left  foot on palpation.  No tenderness to bilateral feet or ankles with range of motion.  Increased height of the medial longitudinal arch noted bilaterally.  Calf is soft/non-tender without warmth/induration    Imaging:         No results found.         TREATMENT:  Debrided the lesion on the lateral aspect left foot today.  I have recommended orthotics with a rear foot post to address the patient's right foot varus.  I recommended the patient apply moleskin over the lateral aspect of the left foot to protect from shoe gear and weightbearing.  The patient is to return to clinic as needed.        Jeb Delgadillo DPM  [facility] Foot & Ankle Surgery/Podiatry         Again, thank you for allowing me to participate in the care of your patient.        Sincerely,        Jeb Boyd DPM

## 2022-05-24 NOTE — PROGRESS NOTES
"FOOT AND ANKLE SURGERY/PODIATRY Progress Note        ASSESSMENT:   Preulcerative lesion left foot  Rear foot varus  Cavus foot deformity    HPI: Levi Soliz was seen again today complaining of a painful reoccurring callus on the lateral aspect  of the left foot.  The patient stated that he became aware of this callus because he noticed some drainage in his socks.  He has very little pain.  He has not had any redness, swelling or bleeding.  He denies any recent trauma to the left.  He has been wearing orthotics but he has not had a new pair for the past 2 years.  He became concerned when he saw the drainage because he did not want to have an infection that was not being treated appropriately.       Past Medical History:   Diagnosis Date     GERD (gastroesophageal reflux disease)      NO ACTIVE PROBLEMS      Spina bifida (H)         Past Surgical History:   Procedure Laterality Date     INCISION AND DRAINAGE OF WOUND Left 12/20/2019    Procedure: INCISION AND DRAINAGE, Left foot;  Surgeon: Jai Crandall DPM;  Location: [facility];  Service: Podiatry       No Known Allergies      Current Outpatient Medications:      GAUZE PADS & DRESSINGS 4\"X4\" PADS, apply over wound for 2 weeks after silvadene applied (Patient not taking: Reported on 9/9/2021), Disp: 2 weeks, Rfl: 0     SILVADENE 1 % EX CREA, apply to wound twice daily with dressing changes (Patient not taking: Reported on 9/9/2021), Disp: 2 weeks, Rfl: 0    No family history on file.    Social History     Socioeconomic History     Marital status: Single     Spouse name: Not on file     Number of children: Not on file     Years of education: Not on file     Highest education level: Not on file   Occupational History     Not on file   Tobacco Use     Smoking status: Current Every Day Smoker     Packs/day: 0.75     Years: 15.00     Pack years: 11.25     Types: Cigarettes     Smokeless tobacco: Never Used   Substance and Sexual Activity     Alcohol use: " Not Currently     Comment: Alcoholic Drinks/day: occasionly     Drug use: Yes     Frequency: 1.0 times per week     Types: Marijuana     Comment: Drug use: vape     Sexual activity: Not on file   Other Topics Concern     Not on file   Social History Narrative     Not on file     Social Determinants of Health     Financial Resource Strain: Not on file   Food Insecurity: Not on file   Transportation Needs: Not on file   Physical Activity: Not on file   Stress: Not on file   Social Connections: Not on file   Intimate Partner Violence: Not on file   Housing Stability: Not on file       10 point Review of Systems is negative except as mentioned above.      There were no vitals taken for this visit.    BMI= There is no height or weight on file to calculate BMI.    OBJECTIVE:  General appearance: Patient is alert and fully cooperative with history & exam.  No sign of distress is noted during the visit.  Vascular: Dorsalis pedis and posterior tibial pulses are palpable. There is pedal hair growth bilaterally.  CFT < 3 sec from anterior tibial surface to distal digits bilaterally. There is no appreciable edema noted.  Dermatologic: Large round thick hyperkeratotic nucleated and slightly macerated lesion on the lateral aspect of the left foot near the base of the fifth metatarsal.  No edema or erythema noted.  No active drainage or bleeding noted.  Turgor and texture are within normal limits. No coloration or temperature changes. No primary or secondary lesions noted.  Neurologic: All epicritic and proprioceptive sensations are grossly intact bilaterally.  Musculoskeletal: All active and passive ankle, subtalar, midtarsal, and 1st MPJ range of motion are grossly intact without pain or crepitus, with the exception of none. Manual muscle strength is within normal limits bilaterally. All dorsiflexors, plantarflexors, invertors, evertors are intact bilaterally.  No tenderness present to  the left  foot on palpation.  No  tenderness to bilateral feet or ankles with range of motion.  Increased height of the medial longitudinal arch noted bilaterally.  Calf is soft/non-tender without warmth/induration    Imaging:         No results found.         TREATMENT:  Debrided the lesion on the lateral aspect left foot today.  I have recommended orthotics with a rear foot post to address the patient's right foot varus.  I recommended the patient apply moleskin over the lateral aspect of the left foot to protect from shoe gear and weightbearing.  The patient is to return to clinic as needed.        Jeb Boyd; WOO  [facility]

## 2022-05-24 NOTE — PATIENT INSTRUCTIONS
What are Prescription Custom Orthotics?  Custom orthotics are specially-made devices designed to support and comfort your feet. Prescription orthotics are crafted for you and no one else. They match the contours of your feet precisely and are designed for the way you move. Orthotics are only manufactured after a podiatrist has conducted a complete evaluation of your feet, ankles, and legs, so the orthotic can accommodate your unique foot structure and pathology.  Prescription orthotics are divided into two categories:  Functional orthotics are designed to control abnormal motion. They may be used to treat foot pain caused by abnormal motion; they can also be used to treat injuries such as shin splints or tendinitis. Functional orthotics are usually crafted of a semi-rigid material such as plastic or graphite.  Accommodative orthotics are softer and meant to provide additional cushioning and support. They can be used to treat diabetic foot ulcers, painful calluses on the bottom of the foot, and other uncomfortable conditions.  Podiatrists use orthotics to treat foot problems such as plantar fasciitis, bursitis, tendinitis, diabetic foot ulcers, and foot, ankle, and heel pain. Clinical research studies have shown that podiatrist-prescribed foot orthotics decrease foot pain and improve function.  Orthotics typically cost more than shoe inserts purchased in a retail store, but the additional cost is usually well worth it. Unlike shoe inserts, orthotics are molded to fit each individual foot, so you can be sure that your orthotics fit and do what they're supposed to do. Prescription orthotics are also made of top-notch materials and last many years when cared for properly. Insurance often helps pay for prescription orthotics.  What are Shoe Inserts?   You've seen them at the grocery store and at the mall. You've probably even seen them on TV and online. Shoe inserts are any kind of non-prescription foot support designed  to be worn inside a shoe. Pre-packaged, mass produced, arch supports are shoe inserts. So are the  custom-made  insoles and foot supports that you can order online or at retail stores. Unless the device has been prescribed by a doctor and crafted for your specific foot, it's a shoe insert, not a custom orthotic device--despite what the ads might say.  Shoe inserts can be very helpful for a variety of foot ailments, including flat arches and foot and leg pain. They can cushion your feet, provide comfort, and support your arches, but they can't correct biomechanical foot problems or cure long-standing foot issues.  The most common types of shoe inserts are:  Arch supports: Some people have high arches. Others have low arches or flat feet. Arch supports generally have a  bumped-up  appearance and are designed to support the foot's natural arch.   Insoles: Insoles slip into your shoe to provide extra cushioning and support. Insoles are often made of gel, foam, or plastic.   Heel liners: Heel liners, sometimes called heel pads or heel cups, provide extra cushioning in the heel region. They may be especially useful for patients who have foot pain caused by age-related thinning of the heels' natural fat pads.   Foot cushions: Do your shoes rub against your heel or your toes? Foot cushions come in many different shapes and sizes and can be used as a barrier between you and your shoe.  Choosing an Over-the-Counter Shoe Insert  Selecting a shoe insert from the wide variety of devices on the market can be overwhelming. Here are some podiatrist-tested tips to help you find the insert that best meets your needs:  Consider your health. Do you have diabetes? Problems with circulation? An over-the-counter insert may not be your best bet. Diabetes and poor circulation increase your risk of foot ulcers and infections, so schedule an appointment with a podiatrist. He or she can help you select a solution that won't cause additional  health problems.   Think about the purpose. Are you planning to run a marathon, or do you just need a little arch support in your work shoes? Look for a product that fits your planned level of activity.   Bring your shoes. For the insert to be effective, it has to fit into your shoes. So bring your sneakers, dress shoes, or work boots--whatever you plan to wear with your insert. Look for an insert that will fit the contours of your shoe.   Try them on. If all possible, slip the insert into your shoe and try it out. Walk around a little. How does it feel? Don't assume that feelings of pressure will go away with continued wear. (If you can't try the inserts at the store, ask about the store's return policy and hold on to your receipt.)    Please call one of the [place] locations below to schedule an appointment. If you received a prescription please bring it with you to your appointment. Some locations are limited to what they carry.    Office Locations    [facility]  [address] [place] [address]  Home Medical Equipment, [address]   Phone: [phone]   Orthotics and Prosthetics, [address]   Phone: [phone]    [facility]  [address]  [address]   [place] [address]   Phone: [phone]    [facility]  [address]  [place] [address]  Phone: [phone]    [facility]   [address]  [place] [address]  Phone: [phone]    [facility]  [address]  [place] [address]  Phone: [phone]    [facility]  [address]  [place] [address]  Phone: [phone]    [place]   [address] [place] [address]   Phone: [phone]    WEARING YOUR CUSTOM FOOT ORTHOTICS   Most  insurance plans cover one pair of orthotics per year. You must check with your   insurance plan to see what your payment responsibility will be. Please call your   insurance company by calling the number on the back of your insurance card.   Orthotic's are non-refundable and non-returnable.   Orthotics are made of various designs. Some orthotics are covered with material that extends beyond your toes. If your orthotic is of this design, you will likely need to trim the toe end to get a proper fit. The insole from your shoe can be used as a template. Simply overlay the shoe insert on top of the custom orthotic. Align the heel end while tracing the length of the insert onto the custom orthotic. Use a large scissor to trim the toe end until you get a proper fit in the shoe.   The orthotic needs to be pushed as far back in the shoe as possible. The heel portion should not ride forward so as not to irritate your heel.   Orthotics are designed to work with socks. Excessive perspiration will shorten the life span of the orthotics. Remove the orthotic from the shoe frequently for proper drying.   The break-in period lasts for weeks. People new to orthotics will likely experience new aches and pains. The orthotic is forcing your foot into a new position. Arch, foot and leg muscle aches and fatigue are common during these weeks. Minor discomfort can be considered normal break in phenomenon. Start wearing your orthotic around your home your first day. Limited activity for one to two hours is recommended. You can increase one or two additional hours each day provided the aches and pains are subsiding. The degree of discomfort, fatigue and problems will dictate the speed of break in. You may require multiple weeks to work up to full time use.   Do not continue wearing your orthotics if they are creating problems such as blisters or sores. Do not hesitate to call the clinic to speak with a nurse regarding orthotic   break in,  fit, trimming, etc. You may also need to see the doctor if the orthotics are   simply not working out. Adjustments are sometimes made to improve orthotic   function.     Orthotics will only work in certain styles and types of shoes. Orthotics rarely work in dress shoes. Slip-ons, clogs, sandals and heels are particularly troublesome. Specially designed orthotics may be necessary for these types of shoes. Your custom orthotic was designed for activities that require appropriate walking or running shoes. Lace up athletic shoes, walking shoes or work boots should work appropriately. You may need a wider or longer shoe. Shoes with a removable  or insert work best. In general, you want to remove an insert from the shoe before placing the orthotic into the shoe. Shoes without a removable liner may not work as well.     When purchasing new shoes, bring your orthotics along to get a proper fit. Shop at stores that are familiar with orthotics.   Frequent washing of the orthotic may shorten the life span of the top cover. The top cover can be replaced but will generally last one to five years depending on use and foot perspiration.

## 2022-08-12 ENCOUNTER — TELEPHONE (OUTPATIENT)
Dept: PODIATRY | Facility: CLINIC | Age: 37
End: 2022-08-12

## 2022-08-12 ENCOUNTER — OFFICE VISIT (OUTPATIENT)
Dept: VASCULAR SURGERY | Facility: CLINIC | Age: 37
End: 2022-08-12
Attending: PODIATRIST

## 2022-08-12 VITALS — BODY MASS INDEX: 24.34 KG/M2 | OXYGEN SATURATION: 96 % | HEART RATE: 87 BPM | HEIGHT: 70 IN | WEIGHT: 170 LBS

## 2022-08-12 DIAGNOSIS — L97.521 ULCER OF LEFT FOOT, LIMITED TO BREAKDOWN OF SKIN (H): Primary | ICD-10-CM

## 2022-08-12 PROCEDURE — 11042 DBRDMT SUBQ TIS 1ST 20SQCM/<: CPT | Performed by: PODIATRIST

## 2022-08-12 PROCEDURE — G0463 HOSPITAL OUTPT CLINIC VISIT: HCPCS | Mod: 25

## 2022-08-12 ASSESSMENT — PAIN SCALES - GENERAL: PAINLEVEL: NO PAIN (0)

## 2022-08-12 NOTE — PROGRESS NOTES
FOOT AND ANKLE SURGERY/PODIATRY Progress Note      ASSESSMENT:   Ulceration left foot  Adductovarus left foot    A new wound was identified today: yes,  it is located left foot.    TREATMENT:  -The left foot ulceration is stable, no signs of infection. I recommend use of endoform and non-weight bearing.     -After discussion of risk factors and consent obtained 2% Lidocaine HCL jelly was applied, under clean conditions, the left and foot ulceration(s) were debrided using #15 blade scalpel.  Devitalized and nonviable tissue, along with any fibrin and slough, was removed to improve granulation tissue formation, stimulate wound healing, decrease overall bacteria load, disrupt biofilm formation and decrease edge senescence. Wound drainage was scant No. Total excisional debridement was 0.28 sq cm into the subcutaneous tissue with a depth of 0.2 cm.   Ulcers were improved afterwards and .  Measures were as noted on the flow sheet. Collagen with a gauze dresssing was applied. He will continue to apply Collagen with a gauze dresssing as directed.    -He will follow-up in 3 weeks.    Jai Crandall DPM  [facility]      HPI: Levi Soliz was seen again today for a left foot ulceration. He first noticed callus formation in May of this year and was seen by Dr. Boyd and referred for custom inserts. He obtained the inserts about two days ago. Denies removing the callus build-up in the past.      Past Medical History:   Diagnosis Date     GERD (gastroesophageal reflux disease)      NO ACTIVE PROBLEMS      Spina bifida (H)        Past Surgical History:   Procedure Laterality Date     INCISION AND DRAINAGE OF WOUND Left 12/20/2019    Procedure: INCISION AND DRAINAGE, Left foot;  Surgeon: Jai Crandall DPM;  Location: [facility];  Service: Podiatry       No Known Allergies    No current outpatient medications on file.    Review of Systems - 10 point Review of Systems is negative except  "for left foot ulcer which is noted in HPI.      OBJECTIVE:  Pulse 87   Ht 5' 10\" (1.778 m)   Wt 170 lb (77.1 kg)   SpO2 96%   BMI 24.39 kg/m    General appearance: Patient is alert and fully cooperative with history & exam.  No sign of distress is noted during the visit.    Vascular: Dorsalis pedis palpableLeft.  Dermatologic:    VASC Wound Left foot (Active)   Pre Size Length 0.7 08/12/22 1200   Pre Size Width 0.4 08/12/22 1200   Pre Size Depth 0.2 08/12/22 1200   Pre Total Sq cm 0.28 08/12/22 1200   Granular base lateral left foot ulceration, no erythema.   Neurologic: Diminished to light touch Left.  Musculoskeletal: Semi-rigid left foot adductovarus.     Imaging:     No results found.       Picture:         "

## 2022-08-12 NOTE — TELEPHONE ENCOUNTER
Hello, I am emailing with a picture of my foot for review.  I have been seeing  Jeb Boyd, I can not get in until the 23rd and am worried about an infection - ideally i could be prescribed antibiotics in the meantime.   Thanks!     (8/23 appt notes: Weeping pre ulcer on left foot has not gotten better, worried about infection)

## 2022-08-12 NOTE — PATIENT INSTRUCTIONS
Important lnstructions        WEIGHT BEARING STATUS: You are to remain NON WEIGHT BEARING on your left foot. NON WEIGHT BEARING MEANS NO PRESSURE ON YOUR FOOT OR HEEL AT ANY TIME FOR ANY REASON!    2. OFFLOADING DEVICE: Must use a A ROLLING KNEE WALKER at all times! (do not use affected foot to push wheelchair)    3. STABILIZATION DEVICE: Use a CAM BOOT . You will need to WEAR THIS ANYTIME YOU ARE UP AND OUT OF BED, IT IS OKAY TO REMOVE WHEN YOU ARE SLEEPING..       4. ELEVATE: Elevating your leg means laying with your head on a pillow and your foot ABOVE YOUR WAIST.     5. DO NOT MOVE YOUR FOOT.  There is a risk of worsening the wound or incision. To give yourself a higher chance of healing, please DO NOT swing foot back and forth and wiggle foot/toes especially when inside a stabilization device.        Dressing Change lnstructions         - Dressing Application of  Endoform    1. Endoform should be cut to the size of the wound.  It should touch the edges of the ulcer. It is okay if it overlap the edges a small amount.  Then, moisten the Endoform with saline.(If it is easier for you, you can moisten it before laying it in the wound)    2. Cover the wound with Endoform, followed by dry gauze, and secure with roll gauze if needed.     3. Endoform is naturally used by the body over time so you may not find it in the wound when you change your dressing.  If you do find some, gently cleanse the wound with saline. Do not remove the remaining Endoform, which may appear as an off-white to longoria gel, just add Endoform on top.  Usually, more Endoform will need to be added every 5-7 days.     4. Change your top dressing every 1-2 days or as needed depending on drainage.    -Endoform is a collagen dressing created from ovine (sheep) fore-stomach tissue. The collagen extracellular matrix transforms into a soft conforming sheet, which is naturally incorporated into the wound over time.  Collagen dressings are used to stimulate  wound healing.   ,       It IS NOT ok to get your wound wet in the bath or shower    SEEK MEDICAL CARE IF:  You have an increase in swelling, pain, or redness around the wound.  You have an increase in the amount of pus coming from the wound.  There is a bad smell coming from the wound.  The wound appears to be worsening/enlarging  You have a fever greater than 101.5 F      It is ok to continue current wound care treatment/products for the next 2-3 days until new wound care supplies are ordered and arrive. If longer than this please contact our office at [phone].        We want to hear from you!   In the next few weeks, you should receive a call or email to complete a survey about your visit at [facility]. Please help us improve your appointment experience by letting us know how we did today. We strive to make your experience good and value any ways in which we could do better.      We value your input and suggestions.    Thank you for choosing the [facility]!

## 2022-09-02 ENCOUNTER — OFFICE VISIT (OUTPATIENT)
Dept: VASCULAR SURGERY | Facility: CLINIC | Age: 37
End: 2022-09-02
Attending: PODIATRIST
Payer: COMMERCIAL

## 2022-09-02 VITALS
HEART RATE: 86 BPM | BODY MASS INDEX: 24.39 KG/M2 | DIASTOLIC BLOOD PRESSURE: 74 MMHG | SYSTOLIC BLOOD PRESSURE: 124 MMHG | RESPIRATION RATE: 12 BRPM | WEIGHT: 170 LBS | OXYGEN SATURATION: 95 %

## 2022-09-02 DIAGNOSIS — L97.521 ULCER OF LEFT FOOT, LIMITED TO BREAKDOWN OF SKIN (H): Primary | ICD-10-CM

## 2022-09-02 PROCEDURE — 99212 OFFICE O/P EST SF 10 MIN: CPT | Performed by: PODIATRIST

## 2022-09-02 PROCEDURE — G0463 HOSPITAL OUTPT CLINIC VISIT: HCPCS

## 2022-09-02 ASSESSMENT — PAIN SCALES - GENERAL: PAINLEVEL: NO PAIN (0)

## 2022-09-02 NOTE — PATIENT INSTRUCTIONS
Congratulations! Your wound has healed.    For the next 1 weeks Dr. Crandall would like you to remain NON WEIGHT BEARING MEANS NO PRESSURE ON YOUR FOOT OR HEEL AT ANY TIME FOR ANY REASON! on your right foot with the use of your CAM BOOT, to allow the newly healed skin to become stronger.     After 9/9/2022, you can do limited walking in your CAM boot for 2 weeks. After the 2 weeks you can return to regular shoes        You may begin showering as normal immediately    You should avoid soaking your right foot for the next  2 weeks, this includes swimming and hot tubs.    Continue to monitor the area for breakdown & call us if your wound reopens.    Allow the steri strips to fall off on their own, do not pull these off.     We want to hear from you!   In the next few weeks, you should receive a call or email to complete a survey about your visit at [facility]. Please help us improve your appointment experience by letting us know how we did today. We strive to make your experience good and value any ways in which we could do better.      We value your input and suggestions.    Thank you for choosing the [facility]!

## 2022-09-02 NOTE — PROGRESS NOTES
FOOT AND ANKLE SURGERY/PODIATRY Progress Note      ASSESSMENT:   Ulceration left foot  Adductovarus left foot      TREATMENT:  -The left foot ulceration has resolved.     -We discussed that due to his anatomy, there is a pressure point which will continue to build callus tissue. If the callus tissue becomes too thick, skin breakdown will likely occur.     -I recommend use of a pumice stone x2-3 per week to remove callus tissue. I have asked that him return for sharp debridement of the callus prn.     -He will remain non-weight bearing x1 weeks, then limited walking in CAM boot x2 weeks.    -I have asked him to follow-up with [facility] to have his current inserts evaluated for possible modification.     -He is discharged from my care at this time but encouraged to return as concerns develop.     Jai Crandall DPM  [facility] [place]      HPI: Levi Soliz was seen again today for a left foot ulceration. He has remained non-weight bearing as directed.       Past Medical History:   Diagnosis Date     GERD (gastroesophageal reflux disease)      NO ACTIVE PROBLEMS      Spina bifida (H)        Past Surgical History:   Procedure Laterality Date     INCISION AND DRAINAGE OF WOUND Left 12/20/2019    Procedure: INCISION AND DRAINAGE, Left foot;  Surgeon: Jai Crandall DPM;  Location: [facility] [place] [place];  Service: Podiatry       No Known Allergies    No current outpatient medications on file.    Review of Systems - 10 point Review of Systems is negative except for left foot ulcer which is noted in HPI.      OBJECTIVE:  /74   Pulse 86   Resp 12   Wt 77.1 kg (170 lb)   SpO2 95%   BMI 24.39 kg/m    General appearance: Patient is alert and fully cooperative with history & exam.  No sign of distress is noted during the visit.    Vascular: Dorsalis pedis palpableLeft.  Dermatologic:    VASC Wound Left foot (Active)   Post Size Length 0 09/02/22 1300   Post Size Width 0 09/02/22 1300   Post Size  Depth 0 09/02/22 1300   Post Total Sq cm 0 09/02/22 1300   Description callous 09/02/22 1300     Neurologic: Diminished to light touch Left.  Musculoskeletal: Contracted digits noted Left.    Imaging:     No results found.       Picture:

## 2022-10-01 ENCOUNTER — HEALTH MAINTENANCE LETTER (OUTPATIENT)
Age: 37
End: 2022-10-01

## 2023-01-25 ENCOUNTER — OFFICE VISIT (OUTPATIENT)
Dept: VASCULAR SURGERY | Facility: CLINIC | Age: 38
End: 2023-01-25
Attending: PODIATRIST
Payer: COMMERCIAL

## 2023-01-25 VITALS
HEART RATE: 107 BPM | DIASTOLIC BLOOD PRESSURE: 76 MMHG | OXYGEN SATURATION: 95 % | BODY MASS INDEX: 24.39 KG/M2 | WEIGHT: 170 LBS | SYSTOLIC BLOOD PRESSURE: 140 MMHG | RESPIRATION RATE: 12 BRPM

## 2023-01-25 DIAGNOSIS — L97.521 ULCER OF LEFT FOOT, LIMITED TO BREAKDOWN OF SKIN (H): Primary | ICD-10-CM

## 2023-01-25 PROCEDURE — 11042 DBRDMT SUBQ TIS 1ST 20SQCM/<: CPT | Performed by: PODIATRIST

## 2023-01-25 ASSESSMENT — PAIN SCALES - GENERAL: PAINLEVEL: NO PAIN (0)

## 2023-01-25 NOTE — PATIENT INSTRUCTIONS
Important lnstructions    WEIGHT BEARING STATUS: You are to remain NON WEIGHT BEARING on your left foot. NON WEIGHT BEARING MEANS NO PRESSURE ON YOUR FOOT OR HEEL AT ANY TIME FOR ANY REASON!    2. OFFLOADING DEVICE: Must use a A ROLLING KNEE WALKER at all times! (do not use affected foot to push wheelchair)    3. STABILIZATION DEVICE: Use a CAM BOOT . You will need to WEAR THIS ANYTIME YOU ARE UP AND OUT OF BED, IT IS OKAY TO REMOVE WHEN YOU ARE SLEEPING..       4. ELEVATE: Elevating your leg means laying with your head on a pillow and your foot ABOVE YOUR WAIST.     5. DO NOT MOVE YOUR FOOT.  There is a risk of worsening the wound or incision. To give yourself a higher chance of healing, please DO NOT swing foot back and forth and wiggle foot/toes especially when inside a stabilization device.        Dressing Change lnstructions          - Dressing Application of  Endoform    1. Endoform should be cut to the size of the wound.  It should touch the edges of the ulcer. It is okay if it overlap the edges a small amount.  Then, moisten the Endoform with saline.(If it is easier for you, you can moisten it before laying it in the wound)    2. Cover the wound with Endoform, followed by dry gauze, and secure with roll gauze if needed.     3. Endoform is naturally used by the body over time so you may not find it in the wound when you change your dressing.  If you do find some, gently cleanse the wound with saline. Do not remove the remaining Endoform, which may appear as an off-white to longoria gel, just add Endoform on top.  Usually, more Endoform will need to be added every 5-7 days.     4. Change your top dressing every 1-2 days or as needed depending on drainage.    -Endoform is a collagen dressing created from ovine (sheep) fore-stomach tissue. The collagen extracellular matrix transforms into a soft conforming sheet, which is naturally incorporated into the wound over time.  Collagen dressings are used to stimulate  wound healing.   ,         It IS NOT ok to get your wound wet in the bath or shower    SEEK MEDICAL CARE IF:  You have an increase in swelling, pain, or redness around the wound.  You have an increase in the amount of pus coming from the wound.  There is a bad smell coming from the wound.  The wound appears to be worsening/enlarging  You have a fever greater than 101.5 F      It is ok to continue current wound care treatment/products for the next 2-3 days until new wound care supplies are ordered and arrive. If longer than this please contact our office at [phone].        We want to hear from you!   In the next few weeks, you should receive a call or email to complete a survey about your visit at [facility]. Please help us improve your appointment experience by letting us know how we did today. We strive to make your experience good and value any ways in which we could do better.      We value your input and suggestions.    Thank you for choosing the [facility]!

## 2023-01-25 NOTE — LETTER
2023             [facility]             Wound Dressing Rx and Order Form             Order Status:New Order             Verbal: Brook Rivas  [facility]   Account # [id number]  Fax: [phone]  Customer Service: [phone]          Patient Info:  Name: Levi Soliz  : 1985  Address:   [address] [place] [id number]  Phone:       Insurance Info:  PRIMARY INSURANCE: Payor: [employer] / Plan: P[employer] HMO / Product Type: PPO /   Primary Policy ID#: [id number]  SECONDARY INSURANCE:  N/A   Secondary Policy ID#: N/A    Physician Info:   Name:  Jai Crandall DPM   Dept Address/Phones:   [facility] [address] [address] [phone]  Dept: [phone]  Fax: [phone]      Wound info:  Encounter Diagnosis   Name Primary?     Ulcer of left foot, limited to breakdown of skin (H) Yes     VASC Wound Left foot (Active)   Pre Size Length 0.7 22 1200   Pre Size Width 0.4 22 1200   Pre Size Depth 0.2 22 1200   Pre Total Sq cm 0.28 22 1200   Post Size Length 1.5 23 1100   Post Size Width 0.8 23 1100   Post Size Depth 0.2 23 1100   Post Total Sq cm 1.2 23 1100   Description macerated edges 23 1100     Drainage: small  Thickness:  Full  Duration of Need: 60  Days Supply: 30  Start Date: 2023  Starter Kit: Ancillary Kit (saline, gloves, gauze)  Qualifying wound/Debridement: Yes     Dressing Type Brand Size Days Supply  15 Quantity  changes/week   Primary Endoform   2''x2'' 30 Weekly   Secondary Square gauze   4''x4'' 30 Daily    Sof form roll gauze   4''x75'' 30 Daily   Tape Papertape  1in 30 Daily         OK to forward to covered supplier.    Electronically Signed Physician: Jai Crandall DPM Date: 2023

## 2023-01-25 NOTE — PROGRESS NOTES
FOOT AND ANKLE SURGERY/PODIATRY Progress Note      ASSESSMENT:   Ulceration left foot  Adductovarus left foot    A new wound was identified today: yes,  it is located lateral left foot.    TREATMENT:  -The left foot ulceration is stable, no signs of infection.     -We discussed the importance of callus removal to prevent future skin breakdown.     -Recommend non-weight bearing on his left foot.    -After discussion of risk factors and consent obtained 2% Lidocaine HCL jelly was applied, under clean conditions, the left and foot ulceration(s) were debrided using #15 blade scalpel.  Devitalized and nonviable tissue, along with any fibrin and slough, was removed to improve granulation tissue formation, stimulate wound healing, decrease overall bacteria load, disrupt biofilm formation and decrease edge senescence. Wound drainage was scant No. Total excisional debridement was 1.2 sq cm into the subcutaneous tissue with a depth of 0.2 cm.   Ulcers were improved afterwards and .  Measures were as noted on the flow sheet. Collagen with a gauze dresssing was applied. He will continue to apply Collagen with a gauze dresssing as directed.    -He will follow-up in 3 weeks.    Jai Crandall DPM  [facility]      HPI: Levi Soliz was seen again today for a new sore on his left foot. He recently noticed skin discoloration and has tried to remove callus tissue with a pumice stone.       Past Medical History:   Diagnosis Date     GERD (gastroesophageal reflux disease)      NO ACTIVE PROBLEMS      Spina bifida (H)        Past Surgical History:   Procedure Laterality Date     INCISION AND DRAINAGE OF WOUND Left 12/20/2019    Procedure: INCISION AND DRAINAGE, Left foot;  Surgeon: Jai Carndall DPM;  Location: [facility];  Service: Podiatry       No Known Allergies    No current outpatient medications on file.    Review of Systems - 10 point Review of Systems is negative except for left foot  ulcer which is noted in HPI.      OBJECTIVE:  BP (!) 140/76   Pulse 107   Resp 12   Wt 170 lb (77.1 kg)   SpO2 95%   BMI 24.39 kg/m    General appearance: Patient is alert and fully cooperative with history & exam.  No sign of distress is noted during the visit.    Vascular: Dorsalis pedis palpableLeft.  Dermatologic:    VASC Wound Left foot (Active)   Pre Size Length 0.7 08/12/22 1200   Pre Size Width 0.4 08/12/22 1200   Pre Size Depth 0.2 08/12/22 1200   Pre Total Sq cm 0.28 08/12/22 1200   Post Size Length 1.5 01/25/23 1100   Post Size Width 0.8 01/25/23 1100   Post Size Depth 0.2 01/25/23 1100   Post Total Sq cm 1.2 01/25/23 1100   Description macerated edges 01/25/23 1100   Granular base with maceration lateral left foot ulcer, no erythema.  Neurologic: Diminished to light touch Left.  Musculoskeletal: Contracted digits noted Left.    Imaging:     No results found.       Picture:

## 2023-02-05 ENCOUNTER — HEALTH MAINTENANCE LETTER (OUTPATIENT)
Age: 38
End: 2023-02-05

## 2023-02-15 ENCOUNTER — OFFICE VISIT (OUTPATIENT)
Dept: VASCULAR SURGERY | Facility: CLINIC | Age: 38
End: 2023-02-15
Attending: PODIATRIST
Payer: COMMERCIAL

## 2023-02-15 VITALS
WEIGHT: 170 LBS | SYSTOLIC BLOOD PRESSURE: 122 MMHG | RESPIRATION RATE: 18 BRPM | OXYGEN SATURATION: 95 % | DIASTOLIC BLOOD PRESSURE: 78 MMHG | HEART RATE: 99 BPM | BODY MASS INDEX: 24.39 KG/M2

## 2023-02-15 DIAGNOSIS — L97.521 ULCER OF LEFT FOOT, LIMITED TO BREAKDOWN OF SKIN (H): Primary | ICD-10-CM

## 2023-02-15 PROCEDURE — 11042 DBRDMT SUBQ TIS 1ST 20SQCM/<: CPT | Performed by: PODIATRIST

## 2023-02-15 ASSESSMENT — PAIN SCALES - GENERAL: PAINLEVEL: NO PAIN (0)

## 2023-02-15 NOTE — PATIENT INSTRUCTIONS
Important lnstructions    WEIGHT BEARING STATUS: You are to remain NON WEIGHT BEARING on your left foot. NON WEIGHT BEARING MEANS NO PRESSURE ON YOUR FOOT OR HEEL AT ANY TIME FOR ANY REASON!    2. OFFLOADING DEVICE: Must use a A ROLLING KNEE WALKER at all times! (do not use affected foot to push wheelchair)    3. STABILIZATION DEVICE: Use a CAM BOOT . You will need to WEAR THIS ANYTIME YOU ARE UP AND OUT OF BED, IT IS OKAY TO REMOVE WHEN YOU ARE SLEEPING..       4. ELEVATE: Elevating your leg means laying with your head on a pillow and your foot ABOVE YOUR WAIST.     5. DO NOT MOVE YOUR FOOT.  There is a risk of worsening the wound or incision. To give yourself a higher chance of healing, please DO NOT swing foot back and forth and wiggle foot/toes especially when inside a stabilization device.        Dressing Change lnstructions          - Dressing Application of  Endoform    1. Endoform should be cut to the size of the wound.  It should touch the edges of the ulcer. It is okay if it overlap the edges a small amount.  Then, moisten the Endoform with saline.(If it is easier for you, you can moisten it before laying it in the wound)    2. Cover the wound with Endoform, followed by dry gauze, and secure with roll gauze if needed.     3. Endoform is naturally used by the body over time so you may not find it in the wound when you change your dressing.  If you do find some, gently cleanse the wound with saline. Do not remove the remaining Endoform, which may appear as an off-white to longoria gel, just add Endoform on top.  Usually, more Endoform will need to be added every 5-7 days.     4. Change your top dressing every 1-2 days or as needed depending on drainage.    -Endoform is a collagen dressing created from ovine (sheep) fore-stomach tissue. The collagen extracellular matrix transforms into a soft conforming sheet, which is naturally incorporated into the wound over time.  Collagen dressings are used to stimulate  wound healing.   ,         It IS NOT ok to get your wound wet in the bath or shower    SEEK MEDICAL CARE IF:  You have an increase in swelling, pain, or redness around the wound.  You have an increase in the amount of pus coming from the wound.  There is a bad smell coming from the wound.  The wound appears to be worsening/enlarging  You have a fever greater than 101.5 F      It is ok to continue current wound care treatment/products for the next 2-3 days until new wound care supplies are ordered and arrive. If longer than this please contact our office at [phone].        We want to hear from you!   In the next few weeks, you should receive a call or email to complete a survey about your visit at [facility]. Please help us improve your appointment experience by letting us know how we did today. We strive to make your experience good and value any ways in which we could do better.      We value your input and suggestions.    Thank you for choosing the [facility]!

## 2023-02-15 NOTE — PROGRESS NOTES
FOOT AND ANKLE SURGERY/PODIATRY Progress Note      ASSESSMENT:   Ulceration left foot  Adductovarus left foot      TREATMENT:  -The left foot ulceration is measuring smaller today.      -Recommend non-weight bearing on his left foot.     -After discussion of risk factors and consent obtained 2% Lidocaine HCL jelly was applied, under clean conditions, the left and foot ulceration(s) were debrided using #15 blade scalpel.  Devitalized and nonviable tissue, along with any fibrin and slough, was removed to improve granulation tissue formation, stimulate wound healing, decrease overall bacteria load, disrupt biofilm formation and decrease edge senescence. Wound drainage was scant No. Total excisional debridement was 0.12 sq cm into the subcutaneous tissue with a depth of 0.2 cm.   Ulcers were improved afterwards and .  Measures were as noted on the flow sheet. Collagen with a gauze dresssing was applied. He will continue to apply Collagen with a gauze dresssing as directed.     -He will follow-up in 3 weeks.    Jai Crandall DPM  [facility]      HPI: Levi Soliz was seen again today for a sore on his left foot. He has tried to remain non-weight bearing with a knee walker.    Past Medical History:   Diagnosis Date     GERD (gastroesophageal reflux disease)      NO ACTIVE PROBLEMS      Spina bifida (H)        Past Surgical History:   Procedure Laterality Date     INCISION AND DRAINAGE OF WOUND Left 12/20/2019    Procedure: INCISION AND DRAINAGE, Left foot;  Surgeon: Jai Crandall DPM;  Location: [facility];  Service: Podiatry       No Known Allergies    No current outpatient medications on file.    Review of Systems - 10 point Review of Systems is negative except for left foot ulcer which is noted in HPI.      OBJECTIVE:  /78   Pulse 99   Resp 18   Wt 170 lb (77.1 kg)   SpO2 95%   BMI 24.39 kg/m    General appearance: Patient is alert and fully cooperative with  history & exam.  No sign of distress is noted during the visit.    Vascular: Dorsalis pedis palpableLeft.  Dermatologic:    VASC Wound Left foot (Active)   Pre Size Length 0.7 08/12/22 1200   Pre Size Width 0.4 08/12/22 1200   Pre Size Depth 0.2 08/12/22 1200   Pre Total Sq cm 0.28 08/12/22 1200   Post Size Length 0.4 02/15/23 1400   Post Size Width 0.3 02/15/23 1400   Post Size Depth 0.2 02/15/23 1400   Post Total Sq cm 0.12 02/15/23 1400   Description macerated edges 01/25/23 1100   Granular base left foot ulcer, no erythema.  Neurologic: Diminished to light touch Left.  Musculoskeletal: Contracted digits noted Left.    Imaging:     No results found.       Picture:

## 2023-03-22 ENCOUNTER — OFFICE VISIT (OUTPATIENT)
Dept: VASCULAR SURGERY | Facility: CLINIC | Age: 38
End: 2023-03-22
Payer: COMMERCIAL

## 2023-03-22 VITALS
DIASTOLIC BLOOD PRESSURE: 88 MMHG | OXYGEN SATURATION: 96 % | BODY MASS INDEX: 24.82 KG/M2 | SYSTOLIC BLOOD PRESSURE: 140 MMHG | WEIGHT: 173 LBS | RESPIRATION RATE: 16 BRPM | HEART RATE: 90 BPM

## 2023-03-22 DIAGNOSIS — L97.521 ULCER OF LEFT FOOT, LIMITED TO BREAKDOWN OF SKIN (H): Primary | ICD-10-CM

## 2023-03-22 PROCEDURE — 99212 OFFICE O/P EST SF 10 MIN: CPT | Performed by: PODIATRIST

## 2023-03-22 PROCEDURE — G0463 HOSPITAL OUTPT CLINIC VISIT: HCPCS | Performed by: PODIATRIST

## 2023-03-22 ASSESSMENT — PAIN SCALES - GENERAL: PAINLEVEL: NO PAIN (0)

## 2023-03-22 NOTE — PROGRESS NOTES
FOOT AND ANKLE SURGERY/PODIATRY Progress Note      ASSESSMENT:   Ulceration left foot  Adductovarus left foot        TREATMENT:  -The left foot ulceration has resolved.     -We discussed that due to his anatomy, there is a pressure point which will continue to build callus tissue. If the callus tissue becomes too thick, skin breakdown will likely occur.     -I recommend use of a pumice stone x2-3 per week to remove callus tissue. I have asked that him return for sharp debridement of the callus prn.     -He is discharged from my care at this time but encouraged to return as concerns develop.     Jai Crandall DPM  [facility] [place]      HPI: Levi Soliz was seen again today for left foot ulceration.  He is remained nonweightbearing with a knee walker.    Past Medical History:   Diagnosis Date     GERD (gastroesophageal reflux disease)      NO ACTIVE PROBLEMS      Spina bifida (H)        Past Surgical History:   Procedure Laterality Date     INCISION AND DRAINAGE OF WOUND Left 12/20/2019    Procedure: INCISION AND DRAINAGE, Left foot;  Surgeon: Jai Crandall DPM;  Location: [facility];  Service: Podiatry       No Known Allergies    No current outpatient medications on file.    Review of Systems - 10 point Review of Systems is negative except for left foot ulcer which is noted in HPI.      OBJECTIVE:  BP (!) 140/88   Pulse 90   Resp 16   Wt 173 lb (78.5 kg)   SpO2 96%   BMI 24.82 kg/m    General appearance: Patient is alert and fully cooperative with history & exam.  No sign of distress is noted during the visit.    Vascular: Dorsalis pedis palpableLeft.  Dermatologic:    VASC Wound Left foot (Active)   Pre Size Length 0.7 08/12/22 1200   Pre Size Width 0.4 08/12/22 1200   Pre Size Depth 0.2 08/12/22 1200   Pre Total Sq cm 0.28 08/12/22 1200   Post Size Length 0 03/22/23 1440   Post Size Width 0 03/22/23 1440   Post Size Depth 0 03/22/23 1440   Post Total Sq cm 0 03/22/23 1440    Description callous 03/22/23 9510     Neurologic: Diminished to light touch Left.  Musculoskeletal: Adductovarus left foot.    Imaging:     No results found.       Picture:

## 2023-03-22 NOTE — PATIENT INSTRUCTIONS
Congratulations! Your wound has healed.    You may begin weight bearing as normal.    You may begin showering as normal in 2 weeks    You should avoid soaking your left foot for the next  3 weeks, this includes swimming and hot tubs.    Continue to monitor the area for breakdown & call us if your wound reopens.    Your doctor recommends you use a pumice stone to get rid of your callous. You can purchase a pumice stone at your local drug store.    How to use your Pumice Stone        1. Soak your calloused skin in warm water. The most common part of the body to exfoliate with a pumice stone is the feet. Heels tend to develop a layer of hard, calloused skin that can become cracked or scaled. Soak the calloused body part in warm water for about five minutes to soften the skin.    2. Wait until your dry skin has softened. The skin will be easier to remove if it's soft and supple. Feel your skin after several minutes of soaking. If it still feels tough, wait a few more minutes (giving the water a warm-up if necessary). If it's soft, your skin is ready for the pumice stone.     3. Wet the stone. Wetting the stone will help it slide more easily across your skin, rather than catching on it. Run the stone under warm water, or dip it in the water where you're soaking your skin, in order to thoroughly wet it.    4. Rub it gently over the calloused area. Use a circular motion to start sloughing away the dead skin with the pumice stone. If the skin is nice and soft, it should start coming right off. Keep going until you remove the dead skin and get to the fresh, supple skin underneath.   Don't press too hard. Light pressure is all that is needed; let the surface of the stone do the work.   If you're working on your feet, focus on the heels, the sides of your toes, and other areas where dry skin tends to build up.    5. Rinse and repeat. Rinse off the dead skin and take a look to see if you need to keep going. If you still see  bits of dead skin, go over the area again with the pumice stone. Continue using the stone on the area until you're satisfied with the results.   Since the pumice stone will wear down slightly while you use it, you may need to turn it over to get a fresh surface you can use to exfoliate your skin.   Rinse the pumice stone often to keep its surface clean and effective.    6. Dry and moisturize your skin. When you're finished, use a towel to pat your skin dry. Coat the area with an oil or cream to prevent it from drying out too quickly. Your formerly calloused skin should now be soft, supple and gleaming.   Coconut oil, almond oil, or body lotion are all fine to use to condition your skin after pumicing.   Repeat as often as needed to keep your skin in good shape.    CARING FOR YOUR PUMICE STONE:    1. Scrub it after use. Dead skin will build up in the pores of the stone as you use it, so you'll want to clean the stone after use. Use a scrub brush to scrub the stone while holding it under running water. Add a bit of soap to help clean the stone completely. This way your stone will be clean and ready to use next time you need it.     2. Allow it to completely dry out. Set the pumice stone in a dry place so that it doesn't stay damp in between uses. Some pumice stones come with a string attached that allows you to hang the stone to dry. If you let the stone stay wet, bacteria could grow in the pores, making it unsafe to use.     3. Boil it if necessary. Every once in a while, you'll want to give the stone a deep cleaning to make sure it isn't harboring bacteria. Bring a small pot of water to a full boil, drop in the stone, and boil it for five minutes. Use tongs to remove the stone from the water and allow it to dry completely before storing.   If you use the stone frequently, boil it every two weeks to ensure it stays clean.   If you'd like, you can add a capful of bleach to the water to be certain all the bacteria is  killed.    4. Replace the stone when it wears down. Pumice is a soft stone that will eventually wear away after you've used it for awhile. When it gets too small to handle easily, or the surface becomes too smooth to be effective, purchase a new one. Pumice stones are inexpensive and can be found at any store that sells beauty supplies.          We want to hear from you!   In the next few weeks, you should receive a call or email to complete a survey about your visit at [facility]. Please help us improve your appointment experience by letting us know how we did today. We strive to make your experience good and value any ways in which we could do better.      We value your input and suggestions.    Thank you for choosing the [facility]!

## 2023-04-20 NOTE — PROGRESS NOTES
[facility]  [address] [place] [id number]  Dept: [phone]      Physical Therapy Daily Note     Referring MD: Tura Leventhal, MD  Diagnosis:     ICD-10-CM    1. Acute pain of left knee  M25.562       2. Knee stiffness, left  M25.662       3. Effusion of left knee  M25.462       4. Impaired functional mobility, balance, gait, and endurance  Z74.09       5. Muscle weakness  M62.81          Surgery: Left Knee Medial Patellofemoral Ligament Reconstruction Hamstring Autograft And Partial Patellectomy/ Lateral Retinacular Lengthening - Left  Date: 3/30/23  Therapy precautions: Rehab protocol: Routine MPFL protocol, motion as tolerated, gradual progression of weightbearing as tolerated in extension  Co-morbidities affecting plan of care: none  Patient Stated Goals: return to full activity including walking, exercise    Total Timed Procedure Codes: 40 min, Total Time: 55 min Modifier needed: No  Episode visit count:  6     SUBJECTIVE     Pt brought in her home NMES unit. Pt reports that she has been trying to put more weight through the LLE while walking. OBJECTIVE     ROM Measures:   L PROM 4/3/23 4/7/23 4/11 4/20   Knee Extension +5 +7 NT    Knee Flexion 30 43* 45 50       Therapeutic exercise (75473) x 40 min to develop ROM, strength, endurance and flexibility of surgical knee. Standing L hip flexion 2x15 in brace  Standing heel raises 2x10 in brace  NMES L quadriceps- pt instructed in use of home iReliev pain relief and recovery system.   Set at EMS, Program 6  5 sec on/10sec off w/ 2 sec ramp up and down  Quad set x 10 min  Isometric hip adduction w/ quad set x 5 min  AA Heel slides with strap: 4x5 reps  Calf Stretch with strap : 3x20s  Supine plantarflexion against red band x 30  PROM L knee flexion w/ light manual medial patella glide      Vasopneumatic Compression (13987) with cold x 15 minutes: to surgical knee in order to reduce inflammation and swelling/joint Preoperative Exam    Scheduled Procedure: INCISION AND DRAINAGE, Left foot  Surgery Date:  12/20/2019  Surgery Location: [facility], fax [phone]    Surgeon:  Dr Crandall    Assessment/Plan:     1. Preoperative examination  2. Ulcer of left foot, unspecified ulcer stage (H)  - APPROVAL GIVEN to proceed with proposed procedure, without further diagnostic evaluation    Have you had prior anesthesia?: Yes  Have you or any family members had a previous anesthesia reaction:  No  Do you or any family members have a history of a clotting or bleeding disorder?: No      Functional Status: Independent  Patient plans to recover at home with family.     Subjective:      Levi Soliz is a 34 y.o. male who presents for a preoperative consultation.  He has a history of spina bifida and painful calluses of the foot. He was seen in [facility] on/12 with a cellulitis of the left foot.  He was started on Bactrim and referred to podiatry and is now scheduled for surgical debridement. He denies nausea, vomiting, fever.     All other systems reviewed and are negative, other than those listed in the HPI.    Pertinent History  Do you have difficulty breathing or chest pain after walking up a flight of stairs: No  History of obstructive sleep apnea: No  Steroid use in the last 6 months: No  Frequent Aspirin/NSAID use: No  Prior Blood Transfusion: No  Prior Blood Transfusion Reaction: No  If for some reason prior to, during or after the procedure, if it is medically indicated, would you be willing to have a blood transfusion?:  There is no transfusion refusal.    Current Outpatient Medications   Medication Sig Dispense Refill     cephalexin (KEFLEX) 500 MG capsule TK 1 C PO TID TAT       sulfamethoxazole-trimethoprim (BACTRIM DS) 800-160 mg per tablet Take 1 tablet by mouth 2 (two) times a day for 10 days. 20 tablet 0     No current facility-administered medications for this visit.         No Known Allergies    There is no problem  "list on file for this patient.      History reviewed. No pertinent past medical history.    No past surgical history on file.    Social History     Socioeconomic History     Marital status: Single     Spouse name: Not on file     Number of children: Not on file     Years of education: Not on file     Highest education level: Not on file   Occupational History     Not on file   Social Needs     Financial resource strain: Not on file     Food insecurity:     Worry: Not on file     Inability: Not on file     Transportation needs:     Medical: Not on file     Non-medical: Not on file   Tobacco Use     Smoking status: Current Every Day Smoker     Smokeless tobacco: Never Used   Substance and Sexual Activity     Alcohol use: Not on file     Drug use: Not on file     Sexual activity: Not on file   Lifestyle     Physical activity:     Days per week: Not on file     Minutes per session: Not on file     Stress: Not on file   Relationships     Social connections:     Talks on phone: Not on file     Gets together: Not on file     Attends [employer] service: Not on file     Active member of club or organization: Not on file     Attends meetings of clubs or organizations: Not on file     Relationship status: Not on file     Intimate partner violence:     Fear of current or ex partner: Not on file     Emotionally abused: Not on file     Physically abused: Not on file     Forced sexual activity: Not on file   Other Topics Concern     Not on file   Social History Narrative     Not on file           Objective:     Vitals:    12/19/19 1348   BP: 136/76   Pulse: 80   Weight: 163 lb (73.9 kg)   Height: 5' 9\" (1.753 m)         Physical Exam:  Gen: Well developed, well nourished, no acute distress.  HEENT: normocephalic/atraumatic, PERRL/EOMI, TMs: Gray, normal light reflex, no nasal discharge.  Oral mucosa: no erythema/exudate  Neck: No LAD/masses  Lungs: clear bilaterally  Heart: regular rate and rhythm, no murmurs/gallops/rubs  Abdomen: " Normal bowel sounds, soft, non-tender  Lymphatics: no supraclavicular/cervical LAD. No edema.  Neuro: A&O x 3.  Psych: Behavior appropriate, engaging        There are no Patient Instructions on file for this visit.          There is no immunization history on file for this patient.        Electronically signed by MARIELY Contreras 12/19/19 1:45 PM

## 2023-08-08 ENCOUNTER — OFFICE VISIT (OUTPATIENT)
Dept: FAMILY MEDICINE | Facility: CLINIC | Age: 38
End: 2023-08-08
Payer: COMMERCIAL

## 2023-08-08 VITALS
RESPIRATION RATE: 14 BRPM | WEIGHT: 178.9 LBS | HEART RATE: 92 BPM | OXYGEN SATURATION: 96 % | DIASTOLIC BLOOD PRESSURE: 80 MMHG | BODY MASS INDEX: 25.67 KG/M2 | SYSTOLIC BLOOD PRESSURE: 125 MMHG | TEMPERATURE: 97.7 F

## 2023-08-08 DIAGNOSIS — H10.32 ACUTE CONJUNCTIVITIS OF LEFT EYE, UNSPECIFIED ACUTE CONJUNCTIVITIS TYPE: Primary | ICD-10-CM

## 2023-08-08 PROCEDURE — 99203 OFFICE O/P NEW LOW 30 MIN: CPT | Performed by: PHYSICIAN ASSISTANT

## 2023-08-08 RX ORDER — CIPROFLOXACIN HYDROCHLORIDE 3.5 MG/ML
SOLUTION/ DROPS TOPICAL
COMMUNITY
Start: 2023-08-03

## 2023-08-08 NOTE — PATIENT INSTRUCTIONS
Patient presents with left eye redness and photophobia for 1.5 weeks. He is on antibiotic eye drops with no resolution. Appointment made for [facility] eye clinic today. Ddx includes iritis, glaucoma, among others. Seek emergency care if you develop vision loss, severe eye pain, swelling, or redness.

## 2023-08-08 NOTE — PROGRESS NOTES
URGENT CARE VISIT:    SUBJECTIVE:   Levi Soliz is a 37 year old male who presents complaining of moderate left eye redness and light sensitivity for 1.5 week(s).  Onset/timing was sudden, gradual. Associated signs and symptoms include none. He denies vision changes, headache, sore throat, dry cough, fever, chills, and sinus and nasal congestion. He has tried abx drops with no relief of symptoms.  Patient does wear contacts. Recent sick contacts include none.     PMH:   Past Medical History:   Diagnosis Date    GERD (gastroesophageal reflux disease)     NO ACTIVE PROBLEMS     Spina bifida (H)      Allergies: Patient has no known allergies.  Medications:   Current Outpatient Medications   Medication Sig Dispense Refill    ciprofloxacin (CILOXAN) 0.3 % ophthalmic solution PLEASE SEE ATTACHED FOR DETAILED DIRECTIONS (Patient not taking: Reported on 8/8/2023)       Social History:   Social History     Socioeconomic History    Marital status: Single     Spouse name: Not on file    Number of children: Not on file    Years of education: Not on file    Highest education level: Not on file   Occupational History    Not on file   Tobacco Use    Smoking status: Every Day     Packs/day: 0.75     Years: 15.00     Pack years: 11.25     Types: Cigarettes    Smokeless tobacco: Never   Substance and Sexual Activity    Alcohol use: Not Currently     Comment: Alcoholic Drinks/day: occasionly    Drug use: Yes     Frequency: 1.0 times per week     Types: Marijuana     Comment: Drug use: vape    Sexual activity: Not on file   Other Topics Concern    Not on file   Social History Narrative    Not on file     Social Determinants of Health     Financial Resource Strain: Not on file   Food Insecurity: Not on file   Transportation Needs: Not on file   Physical Activity: Not on file   Stress: Not on file   Social Connections: Not on file   Intimate Partner Violence: Not on file   Housing Stability: Not on file       ROS: ROS otherwise found  to be negative except as noted above.    OBJECTIVE:  /80   Pulse 92   Temp 97.7  F (36.5  C) (Oral)   Resp 14   Wt 81.1 kg (178 lb 14.4 oz)   SpO2 96%   BMI 25.67 kg/m    General: WDWN in NAD.   Head: normocephalic, atraumatic   Eyes: right conjunctival injection without mattering.  Nose: No rhinorrhea.  Ears: Bilateral TMs are easily visualized without erythema, injection, or effusion. No erythema or edema of external canals.   Oropharynx: Mildly erythematous oropharynx. No posterior pharyngeal erythema or exudate.  No oral lesions.  Uvula is midline without erythema or edema.  Cardiac: RRR without murmurs, rubs, or gallops.  Respiratory: LCTAB without adventitious sounds. Non-labored breathing.  Lymph nodes: no cervical adenopathy.  Skin: no rashes or lesions      ASSESSMENT:     ICD-10-CM    1. Acute conjunctivitis of left eye, unspecified acute conjunctivitis type  H10.32            PLAN:  Patient Instructions   Patient presents with left eye redness and photophobia for 1.5 weeks. He is on antibiotic eye drops with no resolution. Appointment made for [facility] today. Ddx includes iritis, glaucoma, among others. Seek emergency care if you develop vision loss, severe eye pain, swelling, or redness.     Patient verbalized understanding and is agreeable to plan. The patient was discharged ambulatory and in stable condition.    Nuvia Hyatt PA-C on 8/8/2023 at 1:51 PM

## 2023-10-04 ENCOUNTER — OFFICE VISIT (OUTPATIENT)
Dept: VASCULAR SURGERY | Facility: CLINIC | Age: 38
End: 2023-10-04
Attending: PODIATRIST
Payer: COMMERCIAL

## 2023-10-04 VITALS
SYSTOLIC BLOOD PRESSURE: 143 MMHG | TEMPERATURE: 97.5 F | DIASTOLIC BLOOD PRESSURE: 88 MMHG | HEART RATE: 85 BPM | OXYGEN SATURATION: 98 %

## 2023-10-04 DIAGNOSIS — L97.521 ULCER OF LEFT FOOT, LIMITED TO BREAKDOWN OF SKIN (H): Primary | ICD-10-CM

## 2023-10-04 PROCEDURE — 99213 OFFICE O/P EST LOW 20 MIN: CPT | Mod: 25 | Performed by: PODIATRIST

## 2023-10-04 PROCEDURE — 11057 PARNG/CUTG B9 HYPRKR LES >4: CPT | Performed by: PODIATRIST

## 2023-10-04 PROCEDURE — 99214 OFFICE O/P EST MOD 30 MIN: CPT | Performed by: PODIATRIST

## 2023-10-04 ASSESSMENT — PAIN SCALES - GENERAL: PAINLEVEL: NO PAIN (1)

## 2023-10-04 NOTE — PATIENT INSTRUCTIONS
--Make an appointment with [facility] to have your inserts modified. Phone [phone]    Your doctor recommends you use a pumice stone to get rid of your callous. You can purchase a pumice stone at your local drug store.    How to use your Pumice Stone        1. Soak your calloused skin in warm water. The most common part of the body to exfoliate with a pumice stone is the feet. Heels tend to develop a layer of hard, calloused skin that can become cracked or scaled. Soak the calloused body part in warm water for about five minutes to soften the skin.    2. Wait until your dry skin has softened. The skin will be easier to remove if it's soft and supple. Feel your skin after several minutes of soaking. If it still feels tough, wait a few more minutes (giving the water a warm-up if necessary). If it's soft, your skin is ready for the pumice stone.     3. Wet the stone. Wetting the stone will help it slide more easily across your skin, rather than catching on it. Run the stone under warm water, or dip it in the water where you're soaking your skin, in order to thoroughly wet it.    4. Rub it gently over the calloused area. Use a circular motion to start sloughing away the dead skin with the pumice stone. If the skin is nice and soft, it should start coming right off. Keep going until you remove the dead skin and get to the fresh, supple skin underneath.   Don't press too hard. Light pressure is all that is needed; let the surface of the stone do the work.   If you're working on your feet, focus on the heels, the sides of your toes, and other areas where dry skin tends to build up.    5. Rinse and repeat. Rinse off the dead skin and take a look to see if you need to keep going. If you still see bits of dead skin, go over the area again with the pumice stone. Continue using the stone on the area until you're satisfied with the results.   Since the pumice stone will wear down slightly while you use it, you may need  to turn it over to get a fresh surface you can use to exfoliate your skin.   Rinse the pumice stone often to keep its surface clean and effective.    6. Dry and moisturize your skin. When you're finished, use a towel to pat your skin dry. Coat the area with an oil or cream to prevent it from drying out too quickly. Your formerly calloused skin should now be soft, supple and gleaming.   Coconut oil, almond oil, or body lotion are all fine to use to condition your skin after pumicing.   Repeat as often as needed to keep your skin in good shape.    CARING FOR YOUR PUMICE STONE:    1. Scrub it after use. Dead skin will build up in the pores of the stone as you use it, so you'll want to clean the stone after use. Use a scrub brush to scrub the stone while holding it under running water. Add a bit of soap to help clean the stone completely. This way your stone will be clean and ready to use next time you need it.     2. Allow it to completely dry out. Set the pumice stone in a dry place so that it doesn't stay damp in between uses. Some pumice stones come with a string attached that allows you to hang the stone to dry. If you let the stone stay wet, bacteria could grow in the pores, making it unsafe to use.     3. Boil it if necessary. Every once in a while, you'll want to give the stone a deep cleaning to make sure it isn't harboring bacteria. Bring a small pot of water to a full boil, drop in the stone, and boil it for five minutes. Use tongs to remove the stone from the water and allow it to dry completely before storing.   If you use the stone frequently, boil it every two weeks to ensure it stays clean.   If you'd like, you can add a capful of bleach to the water to be certain all the bacteria is killed.    4. Replace the stone when it wears down. Pumice is a soft stone that will eventually wear away after you've used it for awhile. When it gets too small to handle easily, or the surface becomes too smooth to be  effective, purchase a new one. Pumice stones are inexpensive and can be found at any store that sells beauty supplies.

## 2023-10-04 NOTE — PROGRESS NOTES
FOOT AND ANKLE SURGERY/PODIATRY Progress Note      ASSESSMENT:   Ulceration left foot  Adductovarus left foot        TREATMENT:  -I discussed the patient that there are no open lesions on exam today, however there is thinning of the skin along the plantar lateral left foot at the styloid process with significant maceration.    -I recommend removal of callus tissue on a regular basis (2 to 3 days/week) to prevent skin breakdown.    -I trimmed callus tissue plantar lateral left foot with a #15 blade x8.    -I refer the patient to [facility] prost[facility] for modifications of his current inserts.    -He will follow-up with me as concerns develop.    Jai Crandall DPM  [facility]      HPI: Levi Soliz was seen again today concerned about excessive callus buildup along the lateral left foot.  He has noticed drainage recently and is concerned about any open lesion at this location.  He has been using a pumice stone regularly to remove the callus buildup.    Past Medical History:   Diagnosis Date    GERD (gastroesophageal reflux disease)     NO ACTIVE PROBLEMS     Spina bifida (H)        Past Surgical History:   Procedure Laterality Date    INCISION AND DRAINAGE OF WOUND Left 12/20/2019    Procedure: INCISION AND DRAINAGE, Left foot;  Surgeon: Jai Crandall DPM;  [place] [facility] [place]       No Known Allergies      Current Outpatient Medications:     ciprofloxacin (CILOXAN) 0.3 % ophthalmic solution, PLEASE SEE ATTACHED FOR DETAILED DIRECTIONS (Patient not taking: Reported on 8/8/2023), Disp: , Rfl:     Review of Systems - 10 point Review of Systems is negative except for left foot ulcer which is noted in HPI.      OBJECTIVE:  BP (!) 143/88   Pulse 85   Temp 97.5  F (36.4  C)   SpO2 98%   General appearance: Patient is alert and fully cooperative with history & exam.  No sign of distress is noted during the visit.    Vascular: Dorsalis  pedis palpableLeft.  Dermatologic:    VASC Wound Left foot (Active)   Pre Size Length 0 10/04/23 0900   Pre Size Width 0 10/04/23 0900   Pre Size Depth 0 10/04/23 0900   Pre Total Sq cm 0 10/04/23 0900   Post Size Length 0 03/22/23 1440   Post Size Width 0 03/22/23 1440   Post Size Depth 0 03/22/23 1440   Post Total Sq cm 0 03/22/23 1440   Description callous 10/04/23 0900   Hyperkeratotic tissue plantar lateral aspect of the left foot x8.  After removal, thinning of skin lateral left foot at styloid process with maceration, no open lesions or erythema noted.  Neurologic: Diminished to light touch Left.  Musculoskeletal: Adductovarus left foot.

## 2023-12-27 ENCOUNTER — OFFICE VISIT (OUTPATIENT)
Dept: VASCULAR SURGERY | Facility: CLINIC | Age: 38
End: 2023-12-27
Attending: PODIATRIST
Payer: COMMERCIAL

## 2023-12-27 VITALS
DIASTOLIC BLOOD PRESSURE: 88 MMHG | OXYGEN SATURATION: 98 % | SYSTOLIC BLOOD PRESSURE: 137 MMHG | RESPIRATION RATE: 18 BRPM | HEART RATE: 98 BPM

## 2023-12-27 DIAGNOSIS — L97.529 ULCER OF LEFT FOOT, UNSPECIFIED ULCER STAGE (H): Primary | ICD-10-CM

## 2023-12-27 PROCEDURE — 99214 OFFICE O/P EST MOD 30 MIN: CPT | Performed by: PODIATRIST

## 2023-12-27 PROCEDURE — 99215 OFFICE O/P EST HI 40 MIN: CPT | Performed by: PODIATRIST

## 2023-12-27 ASSESSMENT — PAIN SCALES - GENERAL: PAINLEVEL: MILD PAIN (2)

## 2023-12-27 NOTE — PATIENT INSTRUCTIONS
Call [facility] to schedule an appointment for your inserts [phone]    Your doctor recommends you use a pumice stone to get rid of your callous. You can purchase a pumice stone at your local drug store.    How to use your Pumice Stone        1. Soak your calloused skin in warm water. The most common part of the body to exfoliate with a pumice stone is the feet. Heels tend to develop a layer of hard, calloused skin that can become cracked or scaled. Soak the calloused body part in warm water for about five minutes to soften the skin.    2. Wait until your dry skin has softened. The skin will be easier to remove if it's soft and supple. Feel your skin after several minutes of soaking. If it still feels tough, wait a few more minutes (giving the water a warm-up if necessary). If it's soft, your skin is ready for the pumice stone.     3. Wet the stone. Wetting the stone will help it slide more easily across your skin, rather than catching on it. Run the stone under warm water, or dip it in the water where you're soaking your skin, in order to thoroughly wet it.    4. Rub it gently over the calloused area. Use a circular motion to start sloughing away the dead skin with the pumice stone. If the skin is nice and soft, it should start coming right off. Keep going until you remove the dead skin and get to the fresh, supple skin underneath.   Don't press too hard. Light pressure is all that is needed; let the surface of the stone do the work.   If you're working on your feet, focus on the heels, the sides of your toes, and other areas where dry skin tends to build up.    5. Rinse and repeat. Rinse off the dead skin and take a look to see if you need to keep going. If you still see bits of dead skin, go over the area again with the pumice stone. Continue using the stone on the area until you're satisfied with the results.   Since the pumice stone will wear down slightly while you use it, you may need to turn it over to get a  fresh surface you can use to exfoliate your skin.   Rinse the pumice stone often to keep its surface clean and effective.    6. Dry and moisturize your skin. When you're finished, use a towel to pat your skin dry. Coat the area with an oil or cream to prevent it from drying out too quickly. Your formerly calloused skin should now be soft, supple and gleaming.   Coconut oil, almond oil, or body lotion are all fine to use to condition your skin after pumicing.   Repeat as often as needed to keep your skin in good shape.    CARING FOR YOUR PUMICE STONE:    1. Scrub it after use. Dead skin will build up in the pores of the stone as you use it, so you'll want to clean the stone after use. Use a scrub brush to scrub the stone while holding it under running water. Add a bit of soap to help clean the stone completely. This way your stone will be clean and ready to use next time you need it.     2. Allow it to completely dry out. Set the pumice stone in a dry place so that it doesn't stay damp in between uses. Some pumice stones come with a string attached that allows you to hang the stone to dry. If you let the stone stay wet, bacteria could grow in the pores, making it unsafe to use.     3. Boil it if necessary. Every once in a while, you'll want to give the stone a deep cleaning to make sure it isn't harboring bacteria. Bring a small pot of water to a full boil, drop in the stone, and boil it for five minutes. Use tongs to remove the stone from the water and allow it to dry completely before storing.   If you use the stone frequently, boil it every two weeks to ensure it stays clean.   If you'd like, you can add a capful of bleach to the water to be certain all the bacteria is killed.    4. Replace the stone when it wears down. Pumice is a soft stone that will eventually wear away after you've used it for awhile. When it gets too small to handle easily, or the surface becomes too smooth to be effective, purchase a new  one. Pumice stones are inexpensive and can be found at any store that sells beauty supplies.

## 2023-12-27 NOTE — PROGRESS NOTES
FOOT AND ANKLE SURGERY/PODIATRY Progress Note      ASSESSMENT:   Ulceration left foot  Adductovarus left foot      TREATMENT:  -I discussed with the patient that there are no open lesions on the lateral left foot but there is thinning of the skin with maceration consistent with a grade 0 type ulceration.    -Reviewed recommendation for use of a rotary pumice stone likely on a daily basis due to patient's excessive ambulation.  Also recommend he continue with the knee walker as often as possible.    -Risks of excessive callus buildup include but not limited to skin breakdown, need for surgical intervention.    -Previous referral placed to [facility] orthotics and prosthetics.  I have asked him to follow-up with [facility] for custom inserts to offload the lateral left foot.    -All questions invited and answered.  I have asked him to closely monitor for any skin breakdown and follow-up with me with any problems questions or concerns as they develop.    30 minutes spent on the day of encounter doing chart review, history and exam, documentation, and further activities as noted.     Jai Crandall DPM  [facility]      HPI: Levi Soliz was seen again today for concerns related to excessive callus buildup on the lateral left foot.  He has noticed blistering approximately 2 weeks ago and is concerned about any open lesions at this location.  He continues to use a rotary pumice stone to remove callus tissue and is also used a knee walker try to write limited walking on the left foot.    Past Medical History:   Diagnosis Date    GERD (gastroesophageal reflux disease)     NO ACTIVE PROBLEMS     Spina bifida (H)        Past Surgical History:   Procedure Laterality Date    INCISION AND DRAINAGE OF WOUND Left 12/20/2019    Procedure: INCISION AND DRAINAGE, Left foot;  Surgeon: Jai Crandall DPM;  Location: [facility];  Service: Podiatry       No Known Allergies      Current Outpatient  Medications:     ciprofloxacin (CILOXAN) 0.3 % ophthalmic solution, PLEASE SEE ATTACHED FOR DETAILED DIRECTIONS (Patient not taking: Reported on 8/8/2023), Disp: , Rfl:     Review of Systems - 10 point Review of Systems is negative except for left foot ulcer which is noted in HPI.      OBJECTIVE:  /88   Pulse 98   Resp 18   SpO2 98%   General appearance: Patient is alert and fully cooperative with history & exam.  No sign of distress is noted during the visit.    Vascular: Dorsalis pedis palpableLeft.  Dermatologic:    VASC Wound Left foot (Active)   Post Size Length 0 12/27/23 0929   Post Size Width 0 12/27/23 0929   Post Size Depth 0 12/27/23 0929   Post Total Sq cm 0 12/27/23 0929   Description eschar/callous 12/27/23 0929   Thinning of skin with maceration lateral left foot with excessive callus buildup, no erythema or open lesions noted.  Neurologic: Diminished to light touch Left.  Musculoskeletal: Rigid adductovarus deformity left foot.      Picture:

## 2024-03-03 ENCOUNTER — HEALTH MAINTENANCE LETTER (OUTPATIENT)
Age: 39
End: 2024-03-03

## 2025-03-16 ENCOUNTER — HEALTH MAINTENANCE LETTER (OUTPATIENT)
Age: 40
End: 2025-03-16